# Patient Record
Sex: FEMALE | Race: BLACK OR AFRICAN AMERICAN | NOT HISPANIC OR LATINO | URBAN - METROPOLITAN AREA
[De-identification: names, ages, dates, MRNs, and addresses within clinical notes are randomized per-mention and may not be internally consistent; named-entity substitution may affect disease eponyms.]

---

## 2024-05-23 ENCOUNTER — EMERGENCY (EMERGENCY)
Facility: HOSPITAL | Age: 39
LOS: 1 days | Discharge: HOME | End: 2024-05-23
Attending: STUDENT IN AN ORGANIZED HEALTH CARE EDUCATION/TRAINING PROGRAM
Payer: COMMERCIAL

## 2024-05-23 VITALS
TEMPERATURE: 98 F | OXYGEN SATURATION: 100 % | RESPIRATION RATE: 19 BRPM | SYSTOLIC BLOOD PRESSURE: 106 MMHG | DIASTOLIC BLOOD PRESSURE: 70 MMHG | HEART RATE: 85 BPM

## 2024-05-23 VITALS
WEIGHT: 139.99 LBS | HEART RATE: 57 BPM | TEMPERATURE: 99 F | HEIGHT: 65 IN | OXYGEN SATURATION: 98 % | DIASTOLIC BLOOD PRESSURE: 73 MMHG | SYSTOLIC BLOOD PRESSURE: 126 MMHG | RESPIRATION RATE: 22 BRPM

## 2024-05-23 LAB
ALBUMIN SERPL ELPH-MCNC: 4.5 G/DL — SIGNIFICANT CHANGE UP (ref 3.3–5)
ALP SERPL-CCNC: 64 U/L — SIGNIFICANT CHANGE UP (ref 40–120)
ALT FLD-CCNC: 13 U/L — SIGNIFICANT CHANGE UP (ref 10–45)
ANION GAP SERPL CALC-SCNC: 12 MMOL/L — SIGNIFICANT CHANGE UP (ref 5–17)
APPEARANCE UR: CLEAR — SIGNIFICANT CHANGE UP
APTT BLD: 31.7 SEC — SIGNIFICANT CHANGE UP (ref 24.5–35.6)
AST SERPL-CCNC: 16 U/L — SIGNIFICANT CHANGE UP (ref 10–40)
BASOPHILS # BLD AUTO: 0.03 K/UL — SIGNIFICANT CHANGE UP (ref 0–0.2)
BASOPHILS NFR BLD AUTO: 0.3 % — SIGNIFICANT CHANGE UP (ref 0–2)
BILIRUB SERPL-MCNC: 0.3 MG/DL — SIGNIFICANT CHANGE UP (ref 0.2–1.2)
BILIRUB UR-MCNC: NEGATIVE — SIGNIFICANT CHANGE UP
BUN SERPL-MCNC: 15 MG/DL — SIGNIFICANT CHANGE UP (ref 7–23)
CALCIUM SERPL-MCNC: 8.9 MG/DL — SIGNIFICANT CHANGE UP (ref 8.4–10.5)
CHLORIDE SERPL-SCNC: 103 MMOL/L — SIGNIFICANT CHANGE UP (ref 96–108)
CO2 SERPL-SCNC: 22 MMOL/L — SIGNIFICANT CHANGE UP (ref 22–31)
COLOR SPEC: YELLOW — SIGNIFICANT CHANGE UP
CREAT SERPL-MCNC: 0.85 MG/DL — SIGNIFICANT CHANGE UP (ref 0.5–1.3)
DIFF PNL FLD: NEGATIVE — SIGNIFICANT CHANGE UP
EGFR: 90 ML/MIN/1.73M2 — SIGNIFICANT CHANGE UP
EOSINOPHIL # BLD AUTO: 0.04 K/UL — SIGNIFICANT CHANGE UP (ref 0–0.5)
EOSINOPHIL NFR BLD AUTO: 0.4 % — SIGNIFICANT CHANGE UP (ref 0–6)
GLUCOSE SERPL-MCNC: 123 MG/DL — HIGH (ref 70–99)
GLUCOSE UR QL: NEGATIVE MG/DL — SIGNIFICANT CHANGE UP
HCG SERPL-ACNC: 899.1 MIU/ML — HIGH
HCT VFR BLD CALC: 39.7 % — SIGNIFICANT CHANGE UP (ref 34.5–45)
HGB BLD-MCNC: 12.7 G/DL — SIGNIFICANT CHANGE UP (ref 11.5–15.5)
IMM GRANULOCYTES NFR BLD AUTO: 0.4 % — SIGNIFICANT CHANGE UP (ref 0–0.9)
INR BLD: 0.95 RATIO — SIGNIFICANT CHANGE UP (ref 0.85–1.18)
KETONES UR-MCNC: NEGATIVE MG/DL — SIGNIFICANT CHANGE UP
LEUKOCYTE ESTERASE UR-ACNC: NEGATIVE — SIGNIFICANT CHANGE UP
LIDOCAIN IGE QN: 27 U/L — SIGNIFICANT CHANGE UP (ref 7–60)
LYMPHOCYTES # BLD AUTO: 1.46 K/UL — SIGNIFICANT CHANGE UP (ref 1–3.3)
LYMPHOCYTES # BLD AUTO: 14.7 % — SIGNIFICANT CHANGE UP (ref 13–44)
MCHC RBC-ENTMCNC: 26.8 PG — LOW (ref 27–34)
MCHC RBC-ENTMCNC: 32 GM/DL — SIGNIFICANT CHANGE UP (ref 32–36)
MCV RBC AUTO: 83.9 FL — SIGNIFICANT CHANGE UP (ref 80–100)
MONOCYTES # BLD AUTO: 0.43 K/UL — SIGNIFICANT CHANGE UP (ref 0–0.9)
MONOCYTES NFR BLD AUTO: 4.3 % — SIGNIFICANT CHANGE UP (ref 2–14)
NEUTROPHILS # BLD AUTO: 7.9 K/UL — HIGH (ref 1.8–7.4)
NEUTROPHILS NFR BLD AUTO: 79.9 % — HIGH (ref 43–77)
NITRITE UR-MCNC: NEGATIVE — SIGNIFICANT CHANGE UP
NRBC # BLD: 0 /100 WBCS — SIGNIFICANT CHANGE UP (ref 0–0)
PH UR: 8 — SIGNIFICANT CHANGE UP (ref 5–8)
PLATELET # BLD AUTO: 229 K/UL — SIGNIFICANT CHANGE UP (ref 150–400)
POTASSIUM SERPL-MCNC: 4 MMOL/L — SIGNIFICANT CHANGE UP (ref 3.5–5.3)
POTASSIUM SERPL-SCNC: 4 MMOL/L — SIGNIFICANT CHANGE UP (ref 3.5–5.3)
PROT SERPL-MCNC: 7.2 G/DL — SIGNIFICANT CHANGE UP (ref 6–8.3)
PROT UR-MCNC: NEGATIVE MG/DL — SIGNIFICANT CHANGE UP
PROTHROM AB SERPL-ACNC: 10.5 SEC — SIGNIFICANT CHANGE UP (ref 9.5–13)
RBC # BLD: 4.73 M/UL — SIGNIFICANT CHANGE UP (ref 3.8–5.2)
RBC # FLD: 14.8 % — HIGH (ref 10.3–14.5)
SODIUM SERPL-SCNC: 137 MMOL/L — SIGNIFICANT CHANGE UP (ref 135–145)
SP GR SPEC: 1.02 — SIGNIFICANT CHANGE UP (ref 1–1.03)
UROBILINOGEN FLD QL: 0.2 MG/DL — SIGNIFICANT CHANGE UP (ref 0.2–1)
WBC # BLD: 9.9 K/UL — SIGNIFICANT CHANGE UP (ref 3.8–10.5)
WBC # FLD AUTO: 9.9 K/UL — SIGNIFICANT CHANGE UP (ref 3.8–10.5)

## 2024-05-23 PROCEDURE — 86901 BLOOD TYPING SEROLOGIC RH(D): CPT

## 2024-05-23 PROCEDURE — 76817 TRANSVAGINAL US OBSTETRIC: CPT | Mod: 26

## 2024-05-23 PROCEDURE — 80053 COMPREHEN METABOLIC PANEL: CPT

## 2024-05-23 PROCEDURE — 85730 THROMBOPLASTIN TIME PARTIAL: CPT

## 2024-05-23 PROCEDURE — 93975 VASCULAR STUDY: CPT

## 2024-05-23 PROCEDURE — 96361 HYDRATE IV INFUSION ADD-ON: CPT

## 2024-05-23 PROCEDURE — 76817 TRANSVAGINAL US OBSTETRIC: CPT

## 2024-05-23 PROCEDURE — 93975 VASCULAR STUDY: CPT | Mod: 26

## 2024-05-23 PROCEDURE — 85610 PROTHROMBIN TIME: CPT

## 2024-05-23 PROCEDURE — 99285 EMERGENCY DEPT VISIT HI MDM: CPT

## 2024-05-23 PROCEDURE — 76705 ECHO EXAM OF ABDOMEN: CPT

## 2024-05-23 PROCEDURE — 86850 RBC ANTIBODY SCREEN: CPT

## 2024-05-23 PROCEDURE — 81003 URINALYSIS AUTO W/O SCOPE: CPT

## 2024-05-23 PROCEDURE — 87086 URINE CULTURE/COLONY COUNT: CPT

## 2024-05-23 PROCEDURE — 93005 ELECTROCARDIOGRAM TRACING: CPT

## 2024-05-23 PROCEDURE — 76705 ECHO EXAM OF ABDOMEN: CPT | Mod: 26,59

## 2024-05-23 PROCEDURE — 83690 ASSAY OF LIPASE: CPT

## 2024-05-23 PROCEDURE — 96374 THER/PROPH/DIAG INJ IV PUSH: CPT

## 2024-05-23 PROCEDURE — 84702 CHORIONIC GONADOTROPIN TEST: CPT

## 2024-05-23 PROCEDURE — 86900 BLOOD TYPING SEROLOGIC ABO: CPT

## 2024-05-23 PROCEDURE — 85025 COMPLETE CBC W/AUTO DIFF WBC: CPT

## 2024-05-23 PROCEDURE — 99285 EMERGENCY DEPT VISIT HI MDM: CPT | Mod: 25

## 2024-05-23 PROCEDURE — 96375 TX/PRO/DX INJ NEW DRUG ADDON: CPT

## 2024-05-23 RX ORDER — ONDANSETRON 8 MG/1
4 TABLET, FILM COATED ORAL ONCE
Refills: 0 | Status: COMPLETED | OUTPATIENT
Start: 2024-05-23 | End: 2024-05-23

## 2024-05-23 RX ORDER — ACETAMINOPHEN 500 MG
1000 TABLET ORAL ONCE
Refills: 0 | Status: COMPLETED | OUTPATIENT
Start: 2024-05-23 | End: 2024-05-23

## 2024-05-23 RX ORDER — SODIUM CHLORIDE 9 MG/ML
1000 INJECTION, SOLUTION INTRAVENOUS ONCE
Refills: 0 | Status: COMPLETED | OUTPATIENT
Start: 2024-05-23 | End: 2024-05-23

## 2024-05-23 RX ORDER — KETOROLAC TROMETHAMINE 30 MG/ML
15 SYRINGE (ML) INJECTION ONCE
Refills: 0 | Status: DISCONTINUED | OUTPATIENT
Start: 2024-05-23 | End: 2024-05-23

## 2024-05-23 RX ADMIN — ONDANSETRON 4 MILLIGRAM(S): 8 TABLET, FILM COATED ORAL at 06:39

## 2024-05-23 RX ADMIN — SODIUM CHLORIDE 1000 MILLILITER(S): 9 INJECTION, SOLUTION INTRAVENOUS at 06:38

## 2024-05-23 RX ADMIN — Medication 1000 MILLIGRAM(S): at 12:16

## 2024-05-23 RX ADMIN — Medication 15 MILLIGRAM(S): at 06:39

## 2024-05-23 RX ADMIN — Medication 1000 MILLIGRAM(S): at 13:00

## 2024-05-23 RX ADMIN — SODIUM CHLORIDE 1000 MILLILITER(S): 9 INJECTION, SOLUTION INTRAVENOUS at 07:45

## 2024-05-23 RX ADMIN — Medication 15 MILLIGRAM(S): at 07:25

## 2024-05-23 RX ADMIN — Medication 400 MILLIGRAM(S): at 12:01

## 2024-05-23 NOTE — ED PROVIDER NOTE - PATIENT PORTAL LINK FT
You can access the FollowMyHealth Patient Portal offered by [facility] by registering at the following website: [email or web address]. By joining [employer]’s FollowMyHealth portal, you will also be able to view your health information using other applications (apps) compatible with our system.

## 2024-05-23 NOTE — ED PROVIDER NOTE - CCCP TRG CHIEF CMPLNT
0045: TRANSFER - IN REPORT:    Verbal report received from Luann Mclain RN(name) on Alyse Pavon  being received from ED(unit) for routine progression of care      Report consisted of patients Situation, Background, Assessment and   Recommendations(SBAR). Information from the following report(s) SBAR, Kardex, ED Summary, Intake/Output, MAR, Recent Results, Med Rec Status and Cardiac Rhythm NSR/Sinus Tach was reviewed with the receiving nurse. Opportunity for questions and clarification was provided. Assessment completed upon patients arrival to unit and care assumed. [id number]: The documentation for this period is being entered following the guidelines as defined in the [facility] downtime policy by Sai Robbins. 0700: End of Shift Note    Bedside shift change report given to Beth Turner RN (oncoming nurse) by Sai Robbins (offgoing nurse). Report included the following information SBAR, Kardex, Intake/Output, MAR, Recent Results, Med Rec Status and Cardiac Rhythm NSR    Shift worked:  7p-7a     Shift summary and any significant changes:     Patient arrived to unit. No significant changes.      Concerns for physician to address:  None     Zone phone for oncoming shift:   xxxx       Activity:     Number times ambulated in hallways past shift: 0  Number of times OOB to chair past shift: 0    Cardiac:   Cardiac Monitoring: Yes      Cardiac Rhythm: Sinus Tachy    Access:   Current line(s): PIV     Genitourinary:   Urinary status: voiding, incontinent and external catheter    Respiratory:   O2 Device: None (Room air)  Chronic home O2 use?: NO  Incentive spirometer at bedside: NO     GI:     Current diet:  DIET DIABETIC CONSISTENT CARB Regular  Passing flatus: YES  Tolerating current diet: YES       Pain Management:   Patient states pain is manageable on current regimen: YES    Skin:     Interventions: increase time out of bed and internal/external urinary devices    Patient Safety:  Fall Score:    Interventions: bed/chair alarm, gripper socks and pt to call before getting OOB       Length of Stay:  Expected LOS: - - -  Actual LOS: [address] nausea and vomiting/abdominal pain

## 2024-05-23 NOTE — ED PROVIDER NOTE - CLINICAL SUMMARY MEDICAL DECISION MAKING FREE TEXT BOX
38 year old female with no surgical or medical hx comes into the ED for sudden onset right sided abd pain that woke her up from sleep. She has had associated nausea and some vomiting. Ddx includes but not limited to Acute choley, appy, renal stone, UTI/Pyelo. Torsion unlikely given location of pain. Will order CBC, CMP, lipse, UA, RUQ US and will concider CT if US is negative.

## 2024-05-23 NOTE — ED PROVIDER NOTE - OBJECTIVE STATEMENT
38 year old female with no surgical or medical hx comes into the ED for sudden onset right sided abd pain that woke her up from sleep. She has had associated nausea and some vomiting. She has never had this kind of pain before. She denies any change in BM, urinary symptoms, vaginal discharge.

## 2024-05-23 NOTE — ED ADULT NURSE REASSESSMENT NOTE - NS ED NURSE REASSESS COMMENT FT1
0725 Pt in ER red/dora rm 38. IV fluid bolus in progress. IV site intact without sx of infilt RACF. States abd pain 1/10 almost resolved RUQ/RLQ. Awaiting urine sample and ultrasound

## 2024-05-23 NOTE — ED PROVIDER NOTE - CARE PLAN
1 Principal Discharge DX:	Abdominal pain   Principal Discharge DX:	Pregnancy of unknown anatomic location  Secondary Diagnosis:	Abdominal pain

## 2024-05-23 NOTE — ED PROVIDER NOTE - NSPTACCESSSVCSAPPTDETAILS_ED_ALL_ED_FT
Please help make appointment in 2 days for repeat bhcg and ultrasound for rule out ectopic pregnancy

## 2024-05-23 NOTE — ED PROVIDER NOTE - PHYSICAL EXAMINATION
GENERAL: non-toxic appearing  HEAD: normocephalic, atraumatic  HEENT: EOMI, normal conjunctiva, oral mucosa moist, neck supple  CARDIAC: regular rate and rhythm  PULM: normal work of breathing, speaking in full sentences  GI: abdomen nondistended, soft, + RUQ tenderness, no guarding or rebound tenderness  : No CVA tenderness, no suprapubic tenderness  NEURO: alert and oriented x 3, normal speech, no focal motor or sensory deficits, gait normal, no gross neurologic deficit  MSK: No visible deformities, no peripheral edema,   SKIN: No visible rashes, dry, well-perfused  PSYCH: appropriate mood and affect

## 2024-05-23 NOTE — ED PROVIDER NOTE - PROGRESS NOTE DETAILS
Parish, PGY3 - Received signout on patient.  38-year-old woman with no PMH, no surgical history, presenting due to sudden onset right-sided pain associated with nausea and vomiting.  hCG at this time is positive, patient just came back from her right upper quadrant ultrasound which does not appear to show acute cholecystitis will wait on final radiology read.  However on reassessment pain appears to be more right periumbilical, questionable flank although no CVA tenderness.  Due to positive hCG will get a transvaginal ultrasound for further assessment of ovaries and rule out ectopic.  Patient at this time declining additional pain medication Parish, PGY3 - Transvaginal ultrasound is concerning for pregnancy of unknown location.  No abdominal cramping, no vaginal bleeding or discharge.  Patient will follow-up with her own OB/GYN in 2 days for repeat beta hCG and ultrasound, however was advised to return to the emergency room if this was not possible. Patient stable for discharge. Understands the Emergency Room work-up and discharge precautions. Will follow-up with obgyn. passed po trial.

## 2024-05-23 NOTE — ED PROVIDER NOTE - ATTENDING CONTRIBUTION TO CARE
Attending GAGE Mathew performed a history and physical exam of the patient and discussed their management with the resident. I reviewed the resident's note and agree with the documented findings and plan of care, except as noted. My medical decision making and observations are as follows:    38 F with no PMH presenting for evaluation of acute right-sided abdominal pain that woke her from sleep, associated with nausea and NBNB emesis.  No fever, chest pain, shortness of breath, cough, diarrhea, dysuria or hematuria, vaginal bleeding or discharge.  On exam, patient no acute distress, normal work of breathing, speaking full sentences.  Heart lungs clear to auscultation, abdomen soft with right upper quadrant tenderness, no Martin sign.  No CVA tenderness, no pedal edema.    MDM–vital signs stable, symptoms concerning for possible acute cholecystitis versus gastritis versus pancreatitis, will evaluate with labs and ultrasound.  Meds and fluids for symptomatic treatment.    On my independent interpretation, EKG shows NSR rate 93, normal axis, normal intervals, no acute ischemia.  No prior for comparison.

## 2024-05-23 NOTE — ED ADULT NURSE NOTE - BREATHING
PRINCIPAL PROCEDURE  Procedure: Right total knee replacement  Findings and Treatment:        spontaneous/unlabored

## 2024-05-23 NOTE — ED ADULT NURSE NOTE - OBJECTIVE STATEMENT
37 y/o female presents to the ED endorsing RLQ radiating to the RUQ starting at 1 am on 5/23/24. A/Ox4. Ambulatory without assistive devices at baseline. PMH: Denies. PSH: Denies. Patient endorses nausea with bilious vomit. Patient denies fever, chills, chest pain, dyspnea, palpitations and numbness/tingling. Upon assessment, abdomen is soft, tender to the RLQ and non-distended. Safety and comfort provided.

## 2024-05-23 NOTE — ED PROVIDER NOTE - NSFOLLOWUPINSTRUCTIONS_ED_ALL_ED_FT
You were seen in the Emergency Room today because of abdominal pain. You have a pregnancy of unknown location. A copy of your results is included in your discharge paperwork.     You can take Tylenol as directed for pain.     We also recommend you following up with your ObGyn in 2 days for further management and workup. You will need a repeat b-hcg (pregnancy hormone) and ultrasound.     DISCHARGE INSTRUCTIONS:  Please return to the Emergency Room if:   •You have chest pain or trouble breathing.  •You have sharp pain in your lower abdomen that is severe and starts suddenly.  •You have increasing abdominal or pelvic pain or heavy vaginal bleeding.  •Any new or concerning symptoms.

## 2024-05-24 ENCOUNTER — EMERGENCY (EMERGENCY)
Facility: HOSPITAL | Age: 39
LOS: 1 days | Discharge: HOME | End: 2024-05-24
Attending: EMERGENCY MEDICINE
Payer: COMMERCIAL

## 2024-05-24 VITALS
HEIGHT: 65 IN | SYSTOLIC BLOOD PRESSURE: 122 MMHG | RESPIRATION RATE: 18 BRPM | HEART RATE: 88 BPM | TEMPERATURE: 98 F | OXYGEN SATURATION: 100 % | WEIGHT: 139.99 LBS | DIASTOLIC BLOOD PRESSURE: 62 MMHG

## 2024-05-24 LAB
ALBUMIN SERPL ELPH-MCNC: 4 G/DL — SIGNIFICANT CHANGE UP (ref 3.3–5)
ALP SERPL-CCNC: 59 U/L — SIGNIFICANT CHANGE UP (ref 40–120)
ALT FLD-CCNC: 10 U/L — SIGNIFICANT CHANGE UP (ref 10–45)
ANION GAP SERPL CALC-SCNC: 12 MMOL/L — SIGNIFICANT CHANGE UP (ref 5–17)
APTT BLD: 29.1 SEC — SIGNIFICANT CHANGE UP (ref 24.5–35.6)
AST SERPL-CCNC: 16 U/L — SIGNIFICANT CHANGE UP (ref 10–40)
BASE EXCESS BLDV CALC-SCNC: -3.4 MMOL/L — LOW (ref -2–3)
BASOPHILS # BLD AUTO: 0.02 K/UL — SIGNIFICANT CHANGE UP (ref 0–0.2)
BASOPHILS NFR BLD AUTO: 0.2 % — SIGNIFICANT CHANGE UP (ref 0–2)
BILIRUB SERPL-MCNC: 0.4 MG/DL — SIGNIFICANT CHANGE UP (ref 0.2–1.2)
BLD GP AB SCN SERPL QL: NEGATIVE — SIGNIFICANT CHANGE UP
BUN SERPL-MCNC: 10 MG/DL — SIGNIFICANT CHANGE UP (ref 7–23)
CA-I SERPL-SCNC: 1.19 MMOL/L — SIGNIFICANT CHANGE UP (ref 1.15–1.33)
CALCIUM SERPL-MCNC: 8.9 MG/DL — SIGNIFICANT CHANGE UP (ref 8.4–10.5)
CHLORIDE BLDV-SCNC: 105 MMOL/L — SIGNIFICANT CHANGE UP (ref 96–108)
CHLORIDE SERPL-SCNC: 105 MMOL/L — SIGNIFICANT CHANGE UP (ref 96–108)
CO2 BLDV-SCNC: 24 MMOL/L — SIGNIFICANT CHANGE UP (ref 22–26)
CO2 SERPL-SCNC: 21 MMOL/L — LOW (ref 22–31)
CREAT SERPL-MCNC: 1 MG/DL — SIGNIFICANT CHANGE UP (ref 0.5–1.3)
CULTURE RESULTS: SIGNIFICANT CHANGE UP
EGFR: 74 ML/MIN/1.73M2 — SIGNIFICANT CHANGE UP
EOSINOPHIL # BLD AUTO: 0.03 K/UL — SIGNIFICANT CHANGE UP (ref 0–0.5)
EOSINOPHIL NFR BLD AUTO: 0.3 % — SIGNIFICANT CHANGE UP (ref 0–6)
GAS PNL BLDV: 135 MMOL/L — LOW (ref 136–145)
GAS PNL BLDV: SIGNIFICANT CHANGE UP
GLUCOSE BLDV-MCNC: 110 MG/DL — HIGH (ref 70–99)
GLUCOSE SERPL-MCNC: 115 MG/DL — HIGH (ref 70–99)
HCG SERPL-ACNC: 1690 MIU/ML — HIGH
HCO3 BLDV-SCNC: 23 MMOL/L — SIGNIFICANT CHANGE UP (ref 22–29)
HCT VFR BLD CALC: 35.4 % — SIGNIFICANT CHANGE UP (ref 34.5–45)
HCT VFR BLDA CALC: 36 % — SIGNIFICANT CHANGE UP (ref 34.5–46.5)
HGB BLD CALC-MCNC: 12 G/DL — SIGNIFICANT CHANGE UP (ref 11.7–16.1)
HGB BLD-MCNC: 11.5 G/DL — SIGNIFICANT CHANGE UP (ref 11.5–15.5)
IMM GRANULOCYTES NFR BLD AUTO: 0.4 % — SIGNIFICANT CHANGE UP (ref 0–0.9)
INR BLD: 1.1 RATIO — SIGNIFICANT CHANGE UP (ref 0.85–1.18)
LACTATE BLDV-MCNC: 1.4 MMOL/L — SIGNIFICANT CHANGE UP (ref 0.5–2)
LYMPHOCYTES # BLD AUTO: 1.15 K/UL — SIGNIFICANT CHANGE UP (ref 1–3.3)
LYMPHOCYTES # BLD AUTO: 12 % — LOW (ref 13–44)
MCHC RBC-ENTMCNC: 26.7 PG — LOW (ref 27–34)
MCHC RBC-ENTMCNC: 32.5 GM/DL — SIGNIFICANT CHANGE UP (ref 32–36)
MCV RBC AUTO: 82.3 FL — SIGNIFICANT CHANGE UP (ref 80–100)
MONOCYTES # BLD AUTO: 0.62 K/UL — SIGNIFICANT CHANGE UP (ref 0–0.9)
MONOCYTES NFR BLD AUTO: 6.5 % — SIGNIFICANT CHANGE UP (ref 2–14)
NEUTROPHILS # BLD AUTO: 7.74 K/UL — HIGH (ref 1.8–7.4)
NEUTROPHILS NFR BLD AUTO: 80.6 % — HIGH (ref 43–77)
NRBC # BLD: 0 /100 WBCS — SIGNIFICANT CHANGE UP (ref 0–0)
PCO2 BLDV: 44 MMHG — HIGH (ref 39–42)
PH BLDV: 7.32 — SIGNIFICANT CHANGE UP (ref 7.32–7.43)
PLATELET # BLD AUTO: 207 K/UL — SIGNIFICANT CHANGE UP (ref 150–400)
PO2 BLDV: 28 MMHG — SIGNIFICANT CHANGE UP (ref 25–45)
POTASSIUM BLDV-SCNC: 3.8 MMOL/L — SIGNIFICANT CHANGE UP (ref 3.5–5.1)
POTASSIUM SERPL-MCNC: 3.8 MMOL/L — SIGNIFICANT CHANGE UP (ref 3.5–5.3)
POTASSIUM SERPL-SCNC: 3.8 MMOL/L — SIGNIFICANT CHANGE UP (ref 3.5–5.3)
PROT SERPL-MCNC: 7.1 G/DL — SIGNIFICANT CHANGE UP (ref 6–8.3)
PROTHROM AB SERPL-ACNC: 11.5 SEC — SIGNIFICANT CHANGE UP (ref 9.5–13)
RBC # BLD: 4.3 M/UL — SIGNIFICANT CHANGE UP (ref 3.8–5.2)
RBC # FLD: 14.6 % — HIGH (ref 10.3–14.5)
RH IG SCN BLD-IMP: POSITIVE — SIGNIFICANT CHANGE UP
SAO2 % BLDV: 42.5 % — LOW (ref 67–88)
SODIUM SERPL-SCNC: 138 MMOL/L — SIGNIFICANT CHANGE UP (ref 135–145)
SPECIMEN SOURCE: SIGNIFICANT CHANGE UP
WBC # BLD: 9.6 K/UL — SIGNIFICANT CHANGE UP (ref 3.8–10.5)
WBC # FLD AUTO: 9.6 K/UL — SIGNIFICANT CHANGE UP (ref 3.8–10.5)

## 2024-05-24 PROCEDURE — 93975 VASCULAR STUDY: CPT | Mod: 26

## 2024-05-24 PROCEDURE — 76801 OB US < 14 WKS SINGLE FETUS: CPT | Mod: 26,59

## 2024-05-24 PROCEDURE — 76817 TRANSVAGINAL US OBSTETRIC: CPT | Mod: 26

## 2024-05-24 PROCEDURE — 99283 EMERGENCY DEPT VISIT LOW MDM: CPT

## 2024-05-24 PROCEDURE — 99285 EMERGENCY DEPT VISIT HI MDM: CPT

## 2024-05-24 RX ORDER — ACETAMINOPHEN 500 MG
1000 TABLET ORAL ONCE
Refills: 0 | Status: COMPLETED | OUTPATIENT
Start: 2024-05-24 | End: 2024-05-24

## 2024-05-24 RX ORDER — FENTANYL CITRATE 50 UG/ML
25 INJECTION INTRAVENOUS ONCE
Refills: 0 | Status: DISCONTINUED | OUTPATIENT
Start: 2024-05-24 | End: 2024-05-24

## 2024-05-24 RX ORDER — ONDANSETRON 8 MG/1
4 TABLET, FILM COATED ORAL ONCE
Refills: 0 | Status: COMPLETED | OUTPATIENT
Start: 2024-05-24 | End: 2024-05-24

## 2024-05-24 RX ORDER — SODIUM CHLORIDE 9 MG/ML
1000 INJECTION, SOLUTION INTRAVENOUS ONCE
Refills: 0 | Status: COMPLETED | OUTPATIENT
Start: 2024-05-24 | End: 2024-05-24

## 2024-05-24 RX ORDER — ACETAMINOPHEN 500 MG
1000 TABLET ORAL ONCE
Refills: 0 | Status: DISCONTINUED | OUTPATIENT
Start: 2024-05-24 | End: 2024-05-24

## 2024-05-24 RX ORDER — FENTANYL CITRATE 50 UG/ML
50 INJECTION INTRAVENOUS ONCE
Refills: 0 | Status: DISCONTINUED | OUTPATIENT
Start: 2024-05-24 | End: 2024-05-24

## 2024-05-24 RX ADMIN — Medication 400 MILLIGRAM(S): at 23:28

## 2024-05-24 RX ADMIN — ONDANSETRON 4 MILLIGRAM(S): 8 TABLET, FILM COATED ORAL at 21:46

## 2024-05-24 RX ADMIN — FENTANYL CITRATE 50 MICROGRAM(S): 50 INJECTION INTRAVENOUS at 22:10

## 2024-05-24 RX ADMIN — FENTANYL CITRATE 25 MICROGRAM(S): 50 INJECTION INTRAVENOUS at 21:42

## 2024-05-24 RX ADMIN — FENTANYL CITRATE 25 MICROGRAM(S): 50 INJECTION INTRAVENOUS at 23:24

## 2024-05-24 RX ADMIN — SODIUM CHLORIDE 1000 MILLILITER(S): 9 INJECTION, SOLUTION INTRAVENOUS at 21:52

## 2024-05-24 NOTE — ED PROVIDER NOTE - OBJECTIVE STATEMENT
Patient is a 38-year-old female with no seen past medical history presents emergency room for complaining of right-sided abdominal pain.  Patient states that she was here recently for a hCG and transvaginal ultrasound to rule out ectopic.  Patient states that she was supposed to follow-up with her OB/GYN tomorrow however she has been having significant abdominal pain and she decided to come get evaluated.  Patient denies any fevers, chills, vaginal bleeding, vaginal discharge.  Patient states the abdominal pain is radiating to her back and acutely started.

## 2024-05-24 NOTE — ED PROVIDER NOTE - CLINICAL SUMMARY MEDICAL DECISION MAKING FREE TEXT BOX
Patient presents emergency department with significant abdominal pain. Patient is hemodynamically stable afebrile on presentation.  Patient physical exam limited due to pain.  Urgent transvaginal ultrasound ordered and ultrasound tech called to expedite scan.  OB/GYN has been called and will come see the patient.  Will obtain lab work and imaging to evaluate for acute pathologies.  Pending labs and imaging for further disposition.

## 2024-05-24 NOTE — ED PROVIDER NOTE - PROGRESS NOTE DETAILS
Quorishy- Patient in severe pain on exam  U/S shows possible trace free fluid on right side- stat TVUS ordered and tech will come down.   OBGTN has been consulted. Qujung- spoke with Radiology- MRI tech to be called in and urgent MRI to be obtained. Attending MD Dimas.  Pt signed out to me in stable condition pending reassess, dispo, 39 yo fem with desired preg of unknown location now s/p visit 48 hrs with no clarity, worsening abd'l pain, sono with unknown fluid, now s/p MRI with trace free fluid. Yesenia Garland M.D. (Resident Physician): Pt feeling slightly better. Will dc with return to ED or Ob tomorrow for beta Hcg and US. Attending MD Dimas.  Pt reassessed and endorsing sxs improvement with ofirmev.  She has strong concern re: return of pain.  Prolonged discussion re: results of blood/urine/sono/HCG level and MRI.  Counseled re: fibroids/pain control, free pelvic fluid as well as interpretation of HCG level:radiographic findings.  Pt verbalizes understanding of need for repeat beta HCG and understanding of ectopic pregnancy potential with need for confirmation of IUP for safety.  Return to ED for new/worsening sxs including severe or worsened pain, change in nature of pain or development of new sxs not currently experienced.  Rx for antiemetic, f/u with OB. Yesenia Garland M.D. (Resident Physician): Pt started having pain again and was unable to stand up. Will put pt in CDU, Ob/Gyn aware.

## 2024-05-24 NOTE — ED PROVIDER NOTE - NSFOLLOWUPINSTRUCTIONS_ED_ALL_ED_FT
You have been evaluated in the Emergency Department today for abdominal pain in the setting of pregnancy. Your evaluation did not show evidence of medical conditions requiring emergent intervention at this time. Your results are attached.     For pain, you can take TYLENOL/ACETAMINOPHEN up to 4,000mg a day for your symptoms in four divided doses. You can also use mile heat packs/take a warm bath. Please stay well hydrated.    Return to the ER tomorrow Sunday 5/26 to have a repeat beta Hcg drawn and another ultrasound.     Please see the attached information for scheduling an appointment with a Ob/Gyn.    Return to the Emergency Department if you experience worsening or uncontrolled pain, vaginal bleeding, fevers 100.4°F or greater, recurrent vomiting, or any other concerning symptoms.    Thank you for choosing us for your care. You have been evaluated in the Emergency Department today for abdominal pain in the setting of pregnancy. Your evaluation did not show evidence of medical conditions requiring emergent intervention at this time. Your results are attached.     For pain, you can take TYLENOL/ACETAMINOPHEN up to 4,000mg a day for your symptoms in four divided doses. You can also use mile heat packs/take a warm bath. Please stay well hydrated. Please  and take the Zofran for nausea as prescribed.     Return to the ER tomorrow Sunday 5/26 to have a repeat beta Hcg drawn and another ultrasound.     Please see the attached information for scheduling an appointment with a Ob/Gyn.    Return to the Emergency Department if you experience worsening or uncontrolled pain, vaginal bleeding, fevers 100.4°F or greater, recurrent vomiting, or any other concerning symptoms.    Thank you for choosing us for your care.

## 2024-05-24 NOTE — ED ADULT TRIAGE NOTE - CHIEF COMPLAINT QUOTE
37 yo 1-2 weeks pregnant seen yesterday for r/o ectopic back today for increased pain. c/o RUQ/RLQ and chest pain.

## 2024-05-24 NOTE — ED PROVIDER NOTE - PHYSICAL EXAMINATION
Physical Exam:  Gen: significant pain / distress   Head: NCAT  HEENT: EOMI, PEERLA, normal conjunctiva, tongue midline, oral mucosa moist  Lung: CTAB, no respiratory distress, no wheezes/rhonchi/rales B/L, speaking in full sentences  CV: RRR,   Abd: significant RLQ abd tenderness   MSK: no visible deformities, ROM normal in UE/L  Neuro: No focal sensory or motor deficits  Skin: Warm, well perfused, no rash, no leg swelling

## 2024-05-24 NOTE — ED ADULT NURSE NOTE - OBJECTIVE STATEMENT
39 yo female no PMH, A&ox3, presents to ED c/o RUQ/RLQ pain.  PT reports was seen in ED last night, had imaging and HCG, unknown pregnancy location, d/c and was to follow up w/ OB. TO day an hour prior to ED arrival, started having severe onset of pain, n/v.  Breathing even and unlabored, abdomen soft tender to lower abdominal quadrants, no pedal edema. Pt denies chest pain, palpitations, shortness of breath, headache, visual disturbances, numbness/tingling, fever, chills, diaphoresis, diarrhea, or urinary symptoms.

## 2024-05-24 NOTE — ED PROVIDER NOTE - PATIENT PORTAL LINK FT
You can access the FollowMyHealth Patient Portal offered by [facility] by registering at the following website: [email or web address]. By joining [employer]’s FollowMyHealth portal, you will also be able to view your health information using other applications (apps) compatible with our system.

## 2024-05-24 NOTE — ED PROVIDER NOTE - PROVIDER TOKENS
PROVIDER:[TOKEN:[[id number]],FOLLOWUP:[Urgent]],PROVIDER:[TOKEN:[[id number]],FOLLOWUP:[Urgent]]

## 2024-05-24 NOTE — ED PROVIDER NOTE - ATTENDING CONTRIBUTION TO CARE
38-year-old female no significant past medical history presenting to the emergency room with right sided lower abdominal pain as well as right upper quadrant pain.  Patient was seen here yesterday with right-sided abdominal pain found to be pregnant with a beta-hCG of approximately 900 and a transvaginal ultrasound showing pregnancy of unknown location.  Patient was discharged and instructed to follow-up with OB in 48 hours for repeat labs and imaging.  She is returning today due to acute onset of right sided lower abdominal pain as well as right upper quadrant pain.  No central chest pain shortness of breath.  No syncope.  Positive constipation.  No urinary symptoms.  No vaginal bleeding.  Hemodynamically stable not tachycardic or hypotensive.  Physical exam uncomfortable female dry mucous membranes normal heart and lung exam abdomen softly distended with palpable uterus (history of fibroids) and diffuse lower abdominal tenderness to palpation.  Extremities warm and well-perfused cap refill less than 2 seconds.  No focal neurological deficit.  Concern for ruptured ectopic pregnancy.  Bedside POCUS with questionable free fluid in the right upper quadrant.  OB/GYN was consulted they state they will see the patient after labs and imaging have resulted.  Ultrasound was called to perform a bedside transvaginal.  Will send off preop labs provide pain control monitor closely dispo pending results of labs imaging and reassessment.

## 2024-05-24 NOTE — ED PROVIDER NOTE - CARE PROVIDER_API CALL
Belkys Rodriguez  Obstetrics and Gynecology  [address], [address]  [place] [address]  Phone: [phone]  Fax: [phone]  Follow Up Time: Urgent    Argenis Anderson  Obstetrics and Gynecology  [address], [address]  [place] [address]  Phone: [phone]  Fax: [phone]  Follow Up Time: Urgent

## 2024-05-25 VITALS
TEMPERATURE: 98 F | RESPIRATION RATE: 18 BRPM | SYSTOLIC BLOOD PRESSURE: 107 MMHG | HEART RATE: 87 BPM | OXYGEN SATURATION: 100 % | DIASTOLIC BLOOD PRESSURE: 56 MMHG

## 2024-05-25 PROCEDURE — 96375 TX/PRO/DX INJ NEW DRUG ADDON: CPT

## 2024-05-25 PROCEDURE — 86901 BLOOD TYPING SEROLOGIC RH(D): CPT

## 2024-05-25 PROCEDURE — 99284 EMERGENCY DEPT VISIT MOD MDM: CPT | Mod: GC

## 2024-05-25 PROCEDURE — 82435 ASSAY OF BLOOD CHLORIDE: CPT

## 2024-05-25 PROCEDURE — 99223 1ST HOSP IP/OBS HIGH 75: CPT

## 2024-05-25 PROCEDURE — 72195 MRI PELVIS W/O DYE: CPT | Mod: 26,MC

## 2024-05-25 PROCEDURE — 93005 ELECTROCARDIOGRAM TRACING: CPT

## 2024-05-25 PROCEDURE — 82947 ASSAY GLUCOSE BLOOD QUANT: CPT

## 2024-05-25 PROCEDURE — 74181 MRI ABDOMEN W/O CONTRAST: CPT | Mod: MC

## 2024-05-25 PROCEDURE — 76817 TRANSVAGINAL US OBSTETRIC: CPT

## 2024-05-25 PROCEDURE — 85018 HEMOGLOBIN: CPT

## 2024-05-25 PROCEDURE — 82803 BLOOD GASES ANY COMBINATION: CPT

## 2024-05-25 PROCEDURE — 85730 THROMBOPLASTIN TIME PARTIAL: CPT

## 2024-05-25 PROCEDURE — 84132 ASSAY OF SERUM POTASSIUM: CPT

## 2024-05-25 PROCEDURE — 99285 EMERGENCY DEPT VISIT HI MDM: CPT | Mod: 25

## 2024-05-25 PROCEDURE — 84702 CHORIONIC GONADOTROPIN TEST: CPT

## 2024-05-25 PROCEDURE — 96374 THER/PROPH/DIAG INJ IV PUSH: CPT

## 2024-05-25 PROCEDURE — 86900 BLOOD TYPING SEROLOGIC ABO: CPT

## 2024-05-25 PROCEDURE — 85014 HEMATOCRIT: CPT

## 2024-05-25 PROCEDURE — 76801 OB US < 14 WKS SINGLE FETUS: CPT

## 2024-05-25 PROCEDURE — 83605 ASSAY OF LACTIC ACID: CPT

## 2024-05-25 PROCEDURE — 82330 ASSAY OF CALCIUM: CPT

## 2024-05-25 PROCEDURE — 72195 MRI PELVIS W/O DYE: CPT | Mod: MC

## 2024-05-25 PROCEDURE — 80053 COMPREHEN METABOLIC PANEL: CPT

## 2024-05-25 PROCEDURE — 85025 COMPLETE CBC W/AUTO DIFF WBC: CPT

## 2024-05-25 PROCEDURE — G0378: CPT

## 2024-05-25 PROCEDURE — 74181 MRI ABDOMEN W/O CONTRAST: CPT | Mod: 26,MC

## 2024-05-25 PROCEDURE — 86850 RBC ANTIBODY SCREEN: CPT

## 2024-05-25 PROCEDURE — 85610 PROTHROMBIN TIME: CPT

## 2024-05-25 PROCEDURE — 36415 COLL VENOUS BLD VENIPUNCTURE: CPT

## 2024-05-25 PROCEDURE — 96376 TX/PRO/DX INJ SAME DRUG ADON: CPT

## 2024-05-25 PROCEDURE — 93975 VASCULAR STUDY: CPT

## 2024-05-25 PROCEDURE — 84295 ASSAY OF SERUM SODIUM: CPT

## 2024-05-25 RX ORDER — ACETAMINOPHEN 500 MG
1000 TABLET ORAL ONCE
Refills: 0 | Status: COMPLETED | OUTPATIENT
Start: 2024-05-25 | End: 2024-05-25

## 2024-05-25 RX ORDER — SODIUM CHLORIDE 9 MG/ML
1000 INJECTION, SOLUTION INTRAVENOUS
Refills: 0 | Status: DISCONTINUED | OUTPATIENT
Start: 2024-05-25 | End: 2024-05-28

## 2024-05-25 RX ORDER — ONDANSETRON 8 MG/1
1 TABLET, FILM COATED ORAL
Qty: 1 | Refills: 0
Start: 2024-05-25 | End: 2024-05-27

## 2024-05-25 RX ORDER — INDOMETHACIN 50 MG
50 CAPSULE ORAL ONCE
Refills: 0 | Status: COMPLETED | OUTPATIENT
Start: 2024-05-25 | End: 2024-05-25

## 2024-05-25 RX ORDER — FENTANYL CITRATE 50 UG/ML
25 INJECTION INTRAVENOUS ONCE
Refills: 0 | Status: DISCONTINUED | OUTPATIENT
Start: 2024-05-25 | End: 2024-05-25

## 2024-05-25 RX ORDER — ONDANSETRON 8 MG/1
4 TABLET, FILM COATED ORAL EVERY 6 HOURS
Refills: 0 | Status: DISCONTINUED | OUTPATIENT
Start: 2024-05-25 | End: 2024-05-28

## 2024-05-25 RX ADMIN — Medication 400 MILLIGRAM(S): at 08:21

## 2024-05-25 RX ADMIN — FENTANYL CITRATE 25 MICROGRAM(S): 50 INJECTION INTRAVENOUS at 12:28

## 2024-05-25 RX ADMIN — Medication 50 MILLIGRAM(S): at 13:35

## 2024-05-25 RX ADMIN — SODIUM CHLORIDE 125 MILLILITER(S): 9 INJECTION, SOLUTION INTRAVENOUS at 13:14

## 2024-05-25 RX ADMIN — SODIUM CHLORIDE 125 MILLILITER(S): 9 INJECTION, SOLUTION INTRAVENOUS at 12:32

## 2024-05-25 NOTE — ED CDU PROVIDER DISPOSITION NOTE - NSFOLLOWUPCLINICS_GEN_ALL_ED_FT
[facility]  OB-GYN  [address] [place] [address]  Phone: [phone]  Fax:

## 2024-05-25 NOTE — ED CDU PROVIDER DISPOSITION NOTE - PATIENT PORTAL LINK FT
You can access the FollowMyHealth Patient Portal offered by [facility] by registering at the following website: [email or web address]. By joining [facility]’s FollowMyHealth portal, you will also be able to view your health information using other applications (apps) compatible with our system.

## 2024-05-25 NOTE — ED CDU PROVIDER DISPOSITION NOTE - NS ED ATTENDING STATEMENT MOD
This was a shared visit with the NATY. I reviewed and verified the documentation. There are no Wet Read(s) to document.

## 2024-05-25 NOTE — ED CDU PROVIDER INITIAL DAY NOTE - DETAILS
37 y/o female  at 4w3d by LMP  presenting for the second time to the ED for 2 day history of severe right sided abdominal pain  *PAIN CONTROL ABDOMINAL PAIN  -IVF  -PAIN CONTROL  -appreciate OB/GYN recs  -discussed with Dr Dimas

## 2024-05-25 NOTE — CONSULT NOTE ADULT - ATTENDING COMMENTS
OB attg note  37yo P0 at 4+3 here with abdominal pain, constipation, n/v, abd distension and no flatus for 3-4 days. GYN consulted for r/o ectopic pregnancy. HCG rising appropriately, currently with PUL given early GA. REcommend further workup per ED for abdominal pain.    Angel Luis BROWN

## 2024-05-25 NOTE — CONSULT NOTE ADULT - SUBJECTIVE AND OBJECTIVE BOX
**Incomplete note, recs are final at bottom**    AARON FIELD  38y  Female [id number]    HPI:        Name of GYN Physician:   OBHx:    GynHx: Denies fibroids, cysts, endometriosis, STI's, Abnormal pap smears   PMH:  PSH:  Meds:  Allx:  Social History:  Denies smoking use, drug use, alcohol use.   +occasional social alcohol use    Vital Signs Last 24 Hrs  T(C): 36.5 (24 May 2024 21:10), Max: 36.5 (24 May 2024 21:10)  T(F): 97.7 (24 May 2024 21:10), Max: 97.7 (24 May 2024 21:10)  HR: 67 (24 May 2024 23:50) (66 - 88)  BP: 112/81 (24 May 2024 23:50) (109/74 - 123/79)  BP(mean): --  RR: 18 (24 May 2024 23:50) (18 - 18)  SpO2: 100% (24 May 2024 23:50) (100% - 100%)    Parameters below as of 24 May 2024 23:50  Patient On (Oxygen Delivery Method): room air        Physical Exam:   General: sitting comfortably in bed, NAD   HEENT: neck supple, full ROM  CV: extremities well perfused  Lungs: normal respiratory effort on room air  Back: No CVA tenderness  Abd: Soft, non-tender, non-distended  :  No bleeding on pad.  External labia wnl. Bimanual exam with no cervical motion tenderness, uterus wnl, adnexa non palpable b/l.  Cervix closed vs. Cervix dilated  Speculum Exam: No active bleeding from os.  Ext: non-tender b/l, no edema     LABS:                        11.5   9.60  )-----------( 207      ( 24 May 2024 22:00 )             35.4     05-24    138  |  105  |  10  ----------------------------<  115<H>  3.8   |  21<L>  |  1.00    Ca    8.9      24 May 2024 22:00    TPro  7.1  /  Alb  4.0  /  TBili  0.4  /  DBili  x   /  AST  16  /  ALT  10  /  AlkPhos  59  05-24    PT/INR - ( 24 May 2024 22:00 )   PT: 11.5 sec;   INR: 1.10 ratio    PTT - ( 24 May 2024 22:00 )  PTT:29.1 sec    I&O's Detail  Urinalysis Basic - ( 24 May 2024 22:00 )    Color: x / Appearance: x / SG: x / pH: x  Gluc: 115 mg/dL / Ketone: x  / Bili: x / Urobili: x   Blood: x / Protein: x / Nitrite: x   Leuk Esterase: x / RBC: x / WBC x   Sq Epi: x / Non Sq Epi: x / Bacteria: x        RADIOLOGY & ADDITIONAL STUDIES:     AARON FIELD  38y  Female [id number]    HPI: Patient is a 37yo  at 4w3d by LMP  presenting for the second time to the ED for 2 day history of severe right sided abdominal pain in the setting of a PUL, with a highly desired pregnancy. Pain is associated with nausea, emesis x4, subjective fevers/chills, loss of appetite, constipation (no BMs for 3-4 days) and inability to pass flatus for 3 days. Pt's normal bowel movement pattern is either every day or every other day. She has had no vaginal bleeding or spotting. Denies current dizziness, lightheadedness, CP, SOB.    Patient did not know she was pregnant when she presented to the ED yesterday, as she is only now approximately due for her May menses. Pt reports one episode of severe diffuse abdominal pain 2 weeks ago that lasted 1 hour and spontaneously resolved. Pt has not experienced this type of abdominal pain before in her life.    Name of GYN Physician: none  OBHx: medical TOP x1  GynHx: Denies known fibroids, cysts, endometriosis, STI's, Abnormal pap smears   PMH: none  PSH: none  Meds: none  Allx: NKDA    Vital Signs Last 24 Hrs  T(C): 36.5 (24 May 2024 21:10), Max: 36.5 (24 May 2024 21:10)  T(F): 97.7 (24 May 2024 21:10), Max: 97.7 (24 May 2024 21:10)  HR: 67 (24 May 2024 23:50) (66 - 88)  BP: 112/81 (24 May 2024 23:50) (109/74 - 123/79)  RR: 18 (24 May 2024 23:50) (18 - 18)  SpO2: 100% (24 May 2024 23:50) (100% - 100%)    Parameters below as of 24 May 2024 23:50  Patient On (Oxygen Delivery Method): room air    Physical Exam: Wilma MAYER  General: laying comfortably in bed sleeping upon entering the room, gently awoken, then uncomfortable appearing and fidgety while being interviewed  HEENT: neck supple, full ROM  CV: extremities well perfused  Lungs: normal respiratory effort on room air  Abd: Softly distended and tympanic to percussion L>R. Minimal tenderness to palpation of RLQ. No rebound tenderness or guarding  : External labia wnl. Bimanual exam with no cervical motion tenderness, uterus bulky and mobile approx 14 wks size, fullness appreciated posteriorly, adnexa non palpable and nontender b/l.  Cervix closed.  Speculum Exam: deferred  Ext: non-tender b/l, no edema     LABS:                        11.5   9.60  )-----------( 207      ( 24 May 2024 22:00 )             35.4         138  |  105  |  10  ----------------------------<  115<H>  3.8   |  21<L>  |  1.00    Ca    8.9      24 May 2024 22:00    TPro  7.1  /  Alb  4.0  /  TBili  0.4  /  DBili  x   /  AST  16  /  ALT  10  /  AlkPhos  59      PT/INR - ( 24 May 2024 22:00 )   PT: 11.5 sec;   INR: 1.10 ratio    PTT - ( 24 May 2024 22:00 )  PTT:29.1 sec    I&O's Detail  Urinalysis Basic - ( 24 May 2024 22:00 )    Color: x / Appearance: x / SG: x / pH: x  Gluc: 115 mg/dL / Ketone: x  / Bili: x / Urobili: x   Blood: x / Protein: x / Nitrite: x   Leuk Esterase: x / RBC: x / WBC x   Sq Epi: x / Non Sq Epi: x / Bacteria: x    RADIOLOGY & ADDITIONAL STUDIES:  < from: US OB <=14 Weeks, First Gestation (24 @ 23:11) >    ACC: [id number] EXAM:  US DPLX PELVIC   ORDERED BY:  KARAN CAI   ACC: [id number] EXAM:  US OB LES THAN 14 WKS 1ST GEST   ORDERED BY:  KARAN CAI     ACC: [id number] EXAM:  US OB TRANSVAGINAL   ORDERED BY:  KARAN CAI     PROCEDURE DATE:2024      INTERPRETATION:  CLINICAL INFORMATION: Pelvic pain. Concern for ectopic   pregnancy.    LMP: 2024    Beta hCG on 2024: 1690, previously 899 on 2024    Estimated Gestational Age by LMP: 4 weeks 2 days    COMPARISON:Pelvic ultrasound 2024    Endovaginal pelvic sonogram as per order. Transabdominal pelvic sonogram   was also performed as part of our standard protocol. Color and Spectral   Doppler was performed.    FINDINGS:  Uterus: Enlarged uterus [id number] x 9.6 x 6.9 cm. Multiple fibroids   measuring up to 9.8 x 7.7 x 8.7 cm in the posterior intramural/subserosal   space. The endometrium measures 2.3 cm. 1.2 x 0.5 x 1 cm cystic area   within the endometrial cavity. No definitive evidence of intrauterine   pregnancy. Additional hyperechoic ovoid structure again seen within the   endometrium.    Right ovary: 2.2 cm x 2.6 cm x 2.6 cm. There is a 1.4 x 1.8 x 1.8 cm   corpus luteum. The arterial waveforms are documented.  Left ovary: 2.9 cm x 1.9 cm x 1.8 cm. Within normal limits. Arterial and   venous waveforms are documented.    Fluid: Small amount of free fluid in the cul-de-sac and right adnexa    IMPRESSION:  Stable exam. No evidence of intrauterine or extrauterine pregnancy at   this time. These findings represent a pregnancy of unknown location.   Short interval follow-up sonography and serial beta hCG is recommended to   assess for intrauterine pregnancy and exclude ectopic pregnancy.    Multiple uterine fibroids, similar to prior.    --- End of Report ---    JEFFREY BENNETT MD; Resident Radiologist  This document has been electronically signed.  PIEDAD SCHUSTER MD; Attending Radiologist  This document has been electronically signed. May 25 2024 12:47AM    < end of copied text >       AARON FIELD  38y  Female [id number]    HPI: Patient is a 39yo  at 4w3d by LMP  presenting for the second time to the ED for 2 day history of severe right sided abdominal pain in the setting of a PUL, with a highly desired pregnancy. Pain is associated with nausea, emesis x4, subjective fevers/chills, loss of appetite, constipation (no BMs for 3-4 days) and inability to pass flatus for 3 days. Pt's normal bowel movement pattern is either every day or every other day. She has had no vaginal bleeding or spotting. Denies current dizziness, lightheadedness, CP, SOB.    Patient did not know she was pregnant when she presented to the ED yesterday, as she is only now approximately due for her May menses. Pt reports one episode of severe diffuse abdominal pain 2 weeks ago that lasted 1 hour and spontaneously resolved. Pt has not experienced this type of abdominal pain before in her life.    Name of GYN Physician: none  OBHx: medical TOP x1  GynHx: Denies known fibroids, cysts, endometriosis, STI's, Abnormal pap smears   PMH: none  PSH: none  Meds: none  Allx: NKDA    Vital Signs Last 24 Hrs  T(C): 36.5 (24 May 2024 21:10), Max: 36.5 (24 May 2024 21:10)  T(F): 97.7 (24 May 2024 21:10), Max: 97.7 (24 May 2024 21:10)  HR: 67 (24 May 2024 23:50) (66 - 88)  BP: 112/81 (24 May 2024 23:50) (109/74 - 123/79)  RR: 18 (24 May 2024 23:50) (18 - 18)  SpO2: 100% (24 May 2024 23:50) (100% - 100%)    Parameters below as of 24 May 2024 23:50  Patient On (Oxygen Delivery Method): room air    Physical Exam: Wilma MAYER  General: laying comfortably in bed sleeping upon entering the room, gently awoken, then uncomfortable appearing and fidgety while being interviewed  HEENT: neck supple, full ROM  CV: extremities well perfused  Lungs: normal respiratory effort on room air  Abd: Softly distended and tympanic to percussion L>R. Minimal tenderness to palpation of RLQ. No rebound tenderness or guarding  : External labia wnl. Bimanual exam with no cervical motion tenderness, uterus bulky and mobile approx 14 wks size, fullness appreciated posteriorly, adnexa non palpable and nontender b/l.  Cervix closed.  Speculum Exam: deferred  Ext: non-tender b/l, no edema     LABS:                        11.5   9.60  )-----------( 207      ( 24 May 2024 22:00 )             35.4         138  |  105  |  10  ----------------------------<  115<H>  3.8   |  21<L>  |  1.00    Ca    8.9      24 May 2024 22:00    TPro  7.1  /  Alb  4.0  /  TBili  0.4  /  DBili  x   /  AST  16  /  ALT  10  /  AlkPhos  59      PT/INR - ( 24 May 2024 22:00 )   PT: 11.5 sec;   INR: 1.10 ratio    PTT - ( 24 May 2024 22:00 )  PTT:29.1 sec    I&O's Detail  Urinalysis Basic - ( 24 May 2024 22:00 )    Color: x / Appearance: x / SG: x / pH: x  Gluc: 115 mg/dL / Ketone: x  / Bili: x / Urobili: x   Blood: x / Protein: x / Nitrite: x   Leuk Esterase: x / RBC: x / WBC x   Sq Epi: x / Non Sq Epi: x / Bacteria: x    RADIOLOGY & ADDITIONAL STUDIES:  < from: US OB <=14 Weeks, First Gestation (24 @ 23:11) >    ACC: [id number] EXAM:  US DPLX PELVIC   ORDERED BY:  KARAN CAI   ACC: [id number] EXAM:  US OB LES THAN 14 WKS 1ST GEST   ORDERED BY:  KARAN CAI     ACC: [id number] EXAM:  US OB TRANSVAGINAL   ORDERED BY:  KARAN CAI     PROCEDURE DATE:2024      INTERPRETATION:  CLINICAL INFORMATION: Pelvic pain. Concern for ectopic   pregnancy.    LMP: 2024    Beta hCG on 2024: 1690, previously 899 on 2024    Estimated Gestational Age by LMP: 4 weeks 2 days    COMPARISON:Pelvic ultrasound 2024    Endovaginal pelvic sonogram as per order. Transabdominal pelvic sonogram   was also performed as part of our standard protocol. Color and Spectral   Doppler was performed.    FINDINGS:  Uterus: Enlarged uterus [id number] x 9.6 x 6.9 cm. Multiple fibroids   measuring up to 9.8 x 7.7 x 8.7 cm in the posterior intramural/subserosal   space. The endometrium measures 2.3 cm. 1.2 x 0.5 x 1 cm cystic area   within the endometrial cavity. No definitive evidence of intrauterine   pregnancy. Additional hyperechoic ovoid structure again seen within the   endometrium.    Right ovary: 2.2 cm x 2.6 cm x 2.6 cm. There is a 1.4 x 1.8 x 1.8 cm   corpus luteum. The arterial waveforms are documented.  Left ovary: 2.9 cm x 1.9 cm x 1.8 cm. Within normal limits. Arterial and   venous waveforms are documented.    Fluid: Small amount of free fluid in the cul-de-sac and right adnexa    IMPRESSION:  Stable exam. No evidence of intrauterine or extrauterine pregnancy at   this time. These findings represent a pregnancy of unknown location.   Short interval follow-up sonography and serial beta hCG is recommended to   assess for intrauterine pregnancy and exclude ectopic pregnancy.    Multiple uterine fibroids, similar to prior.    --- End of Report ---    JEFFREY BENNETT MD; Resident Radiologist  This document has been electronically signed.  PIEDAD SCHUSTER MD; Attending Radiologist  This document has been electronically signed. May 25 2024 12:47AM    < end of copied text >    < from: MR Pelvis No Cont (24 @ 05:48) >    ACC: [id number] EXAM:  MR PELVIS   ORDERED BY:  KARAN CAI     ACC: [id number] EXAM:  MR ABDOMEN   ORDERED BY:  KARAN CAI     PROCEDURE DATE:  2024          INTERPRETATION:  CLINICAL INDICATION: Patient pregnant with abdominal   pain.    TECHNIQUE: Multiecho, multiplanar magnetic resonance imaging of the   abdomen and pelvis was performed without intravenous contrast.    COMPARISON: Pelvic ultrasound 2024.    FINDINGS:    Enlarged uterus containing multiple space occupying lesions, likely   fibroids some of which appears degenerating. Prominent fluid-filled   endometrium containing too small to characterize cystic focus.    The appendix is normal. The visualized bowel is normal.    Mild pelvic free fluid.    There is fullness of right renal collecting system, likely related to   pregnancy. Correlate with urinalysis and lab values. 1.8 cm splenic cyst.   Too small to characterize cystic foci in the liver. Remaining visualized   portions of the solid abdominal viscera demonstrate normal signal   intensity.    Bone marrow signal intensity is normal.    IMPRESSION:    No MRI findings of acute appendicitis.    Enlarged uterus containing multiple space occupying lesions, likely   fibroids some of which appears degeneratingas better delineated on   recent pelvic ultrasound    Prominent fluid-filled endometrium containing too small to characterize   cystic focus. Differential remains early pregnancy, missed  or   ectopic pregnancy. Correlate with serial beta HCGand short-term imaging   follow-up is advised.    Mild pelvic free fluid.    --- End of Report ---      < end of copied text >

## 2024-05-25 NOTE — ED ADULT NURSE REASSESSMENT NOTE - NS ED NURSE REASSESS COMMENT FT1
Pt received from VICTORIA Haskins. Pt oriented to CDU & plan of care was discussed. Pt A&O x 4. Pt in CDU for IVF, pain control, ob/gyn recs. Pt denies any nausea, vomiting. Pt c/o 6/10 abdominal pain, medicated as per MAR. V/S stable, pt afebrile,  IV in place, patent and free of signs of infiltration. Pt resting in bed. Safety & comfort measures maintained. Call bell in reach. Will continue to monitor.

## 2024-05-25 NOTE — ED CDU PROVIDER DISPOSITION NOTE - ATTENDING APP SHARED VISIT CONTRIBUTION OF CARE
Attending MD Dimas.  Agree with above.  Pt without newly actionable findings, pain improved.  Tolerating PO in CDU.  Stable for discharge home with strict return precautions.

## 2024-05-25 NOTE — CHART NOTE - NSCHARTNOTEFT_GEN_A_CORE
INCOMPLETE  R2 GYN Chart Note    Pt seen and reassessed at bedside. Pt reporting continued severe abdominal pain that is exacerbated by movement. Pt reports Tylenol helps with pain for a brief time only. Pt reports continued nausea and poor appetite. VSS.    -Given pt's persistent pain despite multiple doses of Tylenol/narcotics and MRI demonstrating large 9.8cm fibroid with features suggesting degeneration, discussed PO Indocin trial with pt. Discussed with pt risks/benefits/alternatives of NSAID use in early pregnancy; data is   -Recommend Indocin 50mg PO for suspected degenerating fibroid; pending pt's pain after medication will consider discharging pt on short course of Indocin  -Rest of recs as per consult note  -GYN will continue to follow and reassess pain PRN, please contact at #[phone] with any concerns    D/w Dr. Trejo, OB service attending  Christophe PGY2 INCOMPLETE  R2 GYN Chart Note    Pt seen and reassessed at bedside. Pt reporting continued severe abdominal pain that is exacerbated by movement. Pt reports Tylenol helps with pain for a brief time only. Pt reports continued nausea and poor appetite. VSS.    -Given pt's persistent pain despite multiple doses of Tylenol/narcotics and MRI demonstrating large 9.8cm fibroid with features suggesting degeneration, discussed PO Indocin trial with pt. Discussed with pt risks/benefits/alternatives of NSAID use in early pregnancy. "FDA is warning that using pain-relieving and fever-reducing nonsteroidal anti-inflammatory drugs (NSAIDs) around 20 weeks or later in pregnancy may cause kidney problems in the unborn baby, which can lead to low levels of amniotic fluid that surrounds the baby. This fluid provides a protective cushion and helps the unborn babies’ lungs, digestive system, and muscles develop. Complications can occur with low levels of this fluid." "NSAIDs should not be used during the first two trimesters of pregnancy unless the expected benefits to the mother outweigh the risks to the fetus. If there is a compelling need for NSAID treatment during the first or second trimester, limit use to the lowest effective dose and shortest duration possible." Pt opting to proceed with Indocin trial at this time.  -Recommend Indocin 50mg PO for suspected degenerating fibroid; pending pt's pain after medication will consider discharging pt on short course of Indocin  -Rest of recs as per consult note  -GYN will continue to follow and reassess pain PRN, please contact at #[phone] with any concerns    D/w Dr. Trejo, OB service attending  VTviolet PGY2 R2 GYN Chart Note    Pt seen and reassessed at bedside. Pt reporting continued severe abdominal pain that is exacerbated by movement. Pt reports Tylenol helps with pain for a brief time only. Pt reports continued nausea and poor appetite. VSS.    Given pt's persistent pain despite multiple doses of Tylenol/narcotics and MRI demonstrating large 9.8cm fibroid with features suggesting degeneration, discussed PO Indocin trial with pt. Discussed with pt risks/benefits/alternatives of NSAID use in early pregnancy. "FDA is warning that using pain-relieving and fever-reducing nonsteroidal anti-inflammatory drugs (NSAIDs) around 20 weeks or later in pregnancy may cause kidney problems in the unborn baby, which can lead to low levels of amniotic fluid that surrounds the baby. This fluid provides a protective cushion and helps the unborn babies’ lungs, digestive system, and muscles develop. Complications can occur with low levels of this fluid." "NSAIDs should not be used during the first two trimesters of pregnancy unless the expected benefits to the mother outweigh the risks to the fetus. If there is a compelling need for NSAID treatment during the first or second trimester, limit use to the lowest effective dose and shortest duration possible." Pt opting to proceed with Indocin trial at this time.    -Recommend Indocin 50mg PO for suspected degenerating fibroid; pending pt's pain after medication will consider discharging pt on short course of Indocin  -Rest of recs as per consult note  -GYN will continue to follow and reassess pain PRN, please contact at #[phone] with any concerns    D/w Dr. Trejo, OB service attending  Christophe PGY2 R2 GYN Chart Note    Pt seen and reassessed at bedside. Pt reporting continued severe abdominal pain that is exacerbated by movement. Pt reports Tylenol helps with pain for a brief time only. Pt reports continued nausea and poor appetite. VSS.    Given pt's persistent pain despite multiple doses of Tylenol/narcotics and MRI demonstrating large 9.8cm fibroid with features suggesting degeneration, discussed PO Indocin trial with pt. Discussed with pt risks/benefits/alternatives of NSAID use in early pregnancy. "FDA is warning that using pain-relieving and fever-reducing nonsteroidal anti-inflammatory drugs (NSAIDs) around 20 weeks or later in pregnancy may cause kidney problems in the unborn baby, which can lead to low levels of amniotic fluid that surrounds the baby. This fluid provides a protective cushion and helps the unborn babies’ lungs, digestive system, and muscles develop. Complications can occur with low levels of this fluid." "NSAIDs should not be used during the first two trimesters of pregnancy unless the expected benefits to the mother outweigh the risks to the fetus. If there is a compelling need for NSAID treatment during the first or second trimester, limit use to the lowest effective dose and shortest duration possible." Pt opting to proceed with Indocin trial at this time.    -Recommend Indocin 50mg PO for suspected degenerating fibroid  -Rest of recs as per consult note  -GYN will continue to follow and reassess pain PRN, please contact at #[phone] with any concerns    D/w Dr. Trejo, OB service attending  Christophe PGY2    Addendum:  Per ED, pt reports feeling better after Indocin and would like to be discharged. Recommend f/u 5/26 for repeat bHCG in setting of PUL as discussed previously.    D/w Dr. Maya Saeed PGY2

## 2024-05-25 NOTE — ED CDU PROVIDER DISPOSITION NOTE - CLINICAL COURSE
39 y/o female  at 4w3d by LMP  presenting for the second time to the ED for 2 day history of severe right sided abdominal pain in the setting of a pregnancy of unknown location. Pain is associated with nausea, emesis x4, subjective fevers/chills, loss of appetite, constipation (no BMs for 3-4 days) and inability to pass flatus for 3 days. Pt's normal bowel movement pattern is either every day or every other day. She has had no vaginal bleeding or spotting. Denies current dizziness, lightheadedness, CP, SOB. Patient did not know she was pregnant when she presented to the ED yesterday, as she is only now approximately due for her May menses. Pt reports one episode of severe diffuse abdominal pain 2 weeks ago that lasted 1 hour and spontaneously resolved. Pt has not experienced this type of abdominal pain before in her life.  HCG 1690, US findings represent a pregnancy of unknown location. MRI showed Enlarged uterus containing multiple space occupying lesions, likely fibroids. OB recommended pain control and repeat beta HCG     Sent to CDU for pain control

## 2024-05-25 NOTE — ED CDU PROVIDER INITIAL DAY NOTE - ATTENDING APP SHARED VISIT CONTRIBUTION OF CARE
Attending MD Dimas.  Pt signed out to me in stable condition pending reassess, dispo, 37 yo fem with desired preg of unknown location now s/p visit 48 hrs with no clarity, worsening abd'l pain, sono with unknown fluid, now s/p MRI with trace free fluid.  Pt reassessed and endorsing sxs improvement with ofirmev.  She has strong concern re: return of pain.  Prolonged discussion re: results of blood/urine/sono/HCG level and MRI.  Counseled re: fibroids/pain control, free pelvic fluid as well as interpretation of HCG level:radiographic findings.  Pt verbalizes understanding of need for repeat beta HCG and understanding of ectopic pregnancy potential with need for confirmation of IUP for safety.  Return to ED for new/worsening sxs including severe or worsened pain, change in nature of pain or development of new sxs not currently experienced.  Rx for antiemetic, f/u with OB.  Shortly after this discussion pt's pain returned and she prefers overnight stay in CDU/obs unit.  Will transfer care to CDU/obs unit for supportive care.

## 2024-05-25 NOTE — ED CDU PROVIDER INITIAL DAY NOTE - OBJECTIVE STATEMENT
39 y/o female  at 4w3d by LMP  presenting for the second time to the ED for 2 day history of severe right sided abdominal pain in the setting of a pregnancy of unknown location. Pain is associated with nausea, emesis x4, subjective fevers/chills, loss of appetite, constipation (no BMs for 3-4 days) and inability to pass flatus for 3 days. Pt's normal bowel movement pattern is either every day or every other day. She has had no vaginal bleeding or spotting. Denies current dizziness, lightheadedness, CP, SOB.    Patient did not know she was pregnant when she presented to the ED yesterday, as she is only now approximately due for her May menses. Pt reports one episode of severe diffuse abdominal pain 2 weeks ago that lasted 1 hour and spontaneously resolved. Pt has not experienced this type of abdominal pain before in her life.

## 2024-05-25 NOTE — ED CDU PROVIDER INITIAL DAY NOTE - PROGRESS NOTE DETAILS
MORGAN Paniagua: GYN resident called stating to trial indocin 50mg. GYN had risk benefit discussion with patient. ED attending Dr Dimas agreed with above MORGAN Paniagua: GYN resident called stating to trial Indocin 50mg. GYN had risk benefit discussion with patient. ED attending Dr Dimas agreed with above MORGAN Paniagua: patient reported pain is well controlled after Indocin and requesting discharge home. GYN Alicia aware and agreed to be discharged home with Tylenol only and pt has to return to ED tomorrow for repeat beta hcg testing. ED attending Dr Dimas aware of above and agreed

## 2024-05-25 NOTE — ED CDU PROVIDER DISPOSITION NOTE - NSFOLLOWUPINSTRUCTIONS_ED_ALL_ED_FT
Follow up with your AS PER OB/GYN      Bring a copy of your test results with you to your appointment  Continue your current medication regimen  Return to the Emergency Room if you experience new or worsening symptoms abdominal pain, nausea, vomiting, fever chills, cough, chest pain, shortness of breath, dizziness, slurred speech, weakness, gait abnormality Follow up with your AS PER OB/GYN RETURN TO THE EMERGENCY DEPARTMENT TOMORROW 5/26/24 FOR REPEAT BETA HCG TESTING      Bring a copy of your test results with you to your appointment  Continue your current medication regimen  Return to the Emergency Room if you experience new or worsening symptoms abdominal pain, nausea, vomiting, fever chills, cough, chest pain, shortness of breath, dizziness, slurred speech, weakness, gait abnormality

## 2024-05-25 NOTE — ED ADULT NURSE REASSESSMENT NOTE - NS ED NURSE REASSESS COMMENT FT1
Received report from night VICTORIA Flores. Upon assessment, A&Ox4, denies chest pain, SOB, n/v. Resting comfortably in bed. Vitals WNL. Awaiting GYN.

## 2024-05-25 NOTE — CONSULT NOTE ADULT - ASSESSMENT
Patient is a 37yo  at 4w3d by LMP  presenting for the second time to the ED for 2 day history of severe right sided abdominal pain in the setting of a PUL, with a highly desired pregnancy. Pain is associated with nausea, emesis x4, subjective fevers/chills, loss of appetite, constipation (no BMs for 3-4 days) and inability to pass flatus for 3 days. Patient has an appropriately rising bHCG 899.1 ()->1690 () in less than 48hrs. TVUS showing an enlarged fibroid uterus with no definitive IUP or suspicious adnexal mass, trace RA/CDS free fluid. Patient with minimal tenderness on exam, though seen at bedside after multiple pushes of Fentanyl.    - Patient with several symptoms that are not suggestive of ectopic pregnancy or ruptured ectopic pregnancy  - In the setting of an appropriately rising bHCG, lack of bleeding or spotting, enlarged fibroid uterus that can make visualizing an intrauterine pregnancy more difficult at early stages, would not recommend any interventions including MTX or Dx LSC at this time. Differential still includes ectopic pregnancy, though GYN team with higher suspicion for early IUP.  - Recommend working up alternative diagnoses. Can consider US appendix, abdominal xray, and/or abdominal MRI per primary ED team's workup  - Patient will still require repeat bHCG and TVUS in ED in 48hours (, ) to continue following her pregnancy of unknown location  - GYN to continue to follow closely    D/w GYN service attending, Dr. John Leon PGY2 A/P: Patient is a 39yo  at 4w3d by LMP  presenting for the second time to the ED for 2 day history of severe right sided abdominal pain in the setting of a PUL, with a highly desired pregnancy. Pain is associated with nausea, emesis x4, subjective fevers/chills, loss of appetite, constipation (no BMs for 3-4 days) and inability to pass flatus for 3 days. Patient has an appropriately rising bHCG 899.1 ()->1690 () in less than 48hrs. TVUS showing an enlarged fibroid uterus with no definitive IUP or suspicious adnexal mass, trace RA/CDS free fluid. Patient with minimal tenderness on exam, though seen at bedside after multiple pushes of Fentanyl.    - Patient with several symptoms that are not suggestive of ectopic pregnancy or ruptured ectopic pregnancy  - In the setting of an appropriately rising bHCG, lack of bleeding or spotting, enlarged fibroid uterus that can make visualizing an intrauterine pregnancy more difficult at early stages, would not recommend any interventions including MTX or Dx LSC at this time. Differential still includes ectopic pregnancy, though GYN team with higher suspicion for early IUP.  - Recommend working up alternative diagnoses. Can consider US appendix, abdominal xray, and/or abdominal MRI per primary ED team's workup  - Patient will still require repeat bHCG and TVUS in ED in 48hours (, ) to continue following her pregnancy of unknown location  - GYN to continue to follow closely    D/w GYN service attending, Dr. John Leon PGY2    Addendum:  MRI reviewed. No findings of acute appendicitis. Enlarged uterus with multiple space-occupying lesions, likely fibroids some of which appear degenerating. Prominent fluid-filled endometrium with small cystic focus.     -Recommendations as above (repeat bHCG/TVUS in ED in 48h, return precautions for ectopic pregnancy including sudden severe abdominal pain, dizzy spells/syncope, inability to tolerate PO)  -Rest of management as per primary ED team    GYN service attending made aware  Christophe PGY2

## 2024-05-26 ENCOUNTER — EMERGENCY (EMERGENCY)
Facility: HOSPITAL | Age: 39
LOS: 1 days | Discharge: HOME | End: 2024-05-26
Attending: STUDENT IN AN ORGANIZED HEALTH CARE EDUCATION/TRAINING PROGRAM
Payer: COMMERCIAL

## 2024-05-26 VITALS
RESPIRATION RATE: 17 BRPM | HEART RATE: 90 BPM | WEIGHT: 139.99 LBS | OXYGEN SATURATION: 99 % | DIASTOLIC BLOOD PRESSURE: 73 MMHG | HEIGHT: 65 IN | TEMPERATURE: 98 F | SYSTOLIC BLOOD PRESSURE: 115 MMHG

## 2024-05-26 VITALS
SYSTOLIC BLOOD PRESSURE: 111 MMHG | DIASTOLIC BLOOD PRESSURE: 74 MMHG | HEART RATE: 78 BPM | RESPIRATION RATE: 16 BRPM | OXYGEN SATURATION: 99 % | TEMPERATURE: 99 F

## 2024-05-26 LAB
ALBUMIN SERPL ELPH-MCNC: 3.9 G/DL — SIGNIFICANT CHANGE UP (ref 3.3–5)
ALP SERPL-CCNC: 57 U/L — SIGNIFICANT CHANGE UP (ref 40–120)
ALT FLD-CCNC: 8 U/L — LOW (ref 10–45)
ANION GAP SERPL CALC-SCNC: 11 MMOL/L — SIGNIFICANT CHANGE UP (ref 5–17)
APPEARANCE UR: CLEAR — SIGNIFICANT CHANGE UP
APTT BLD: 29.2 SEC — SIGNIFICANT CHANGE UP (ref 24.5–35.6)
AST SERPL-CCNC: 12 U/L — SIGNIFICANT CHANGE UP (ref 10–40)
BACTERIA # UR AUTO: NEGATIVE /HPF — SIGNIFICANT CHANGE UP
BASOPHILS # BLD AUTO: 0.02 K/UL — SIGNIFICANT CHANGE UP (ref 0–0.2)
BASOPHILS NFR BLD AUTO: 0.2 % — SIGNIFICANT CHANGE UP (ref 0–2)
BILIRUB SERPL-MCNC: 0.5 MG/DL — SIGNIFICANT CHANGE UP (ref 0.2–1.2)
BILIRUB UR-MCNC: NEGATIVE — SIGNIFICANT CHANGE UP
BLD GP AB SCN SERPL QL: NEGATIVE — SIGNIFICANT CHANGE UP
BUN SERPL-MCNC: 13 MG/DL — SIGNIFICANT CHANGE UP (ref 7–23)
CALCIUM SERPL-MCNC: 8.8 MG/DL — SIGNIFICANT CHANGE UP (ref 8.4–10.5)
CAST: 0 /LPF — SIGNIFICANT CHANGE UP (ref 0–4)
CHLORIDE SERPL-SCNC: 102 MMOL/L — SIGNIFICANT CHANGE UP (ref 96–108)
CO2 SERPL-SCNC: 22 MMOL/L — SIGNIFICANT CHANGE UP (ref 22–31)
COLOR SPEC: YELLOW — SIGNIFICANT CHANGE UP
CREAT SERPL-MCNC: 1.18 MG/DL — SIGNIFICANT CHANGE UP (ref 0.5–1.3)
DIFF PNL FLD: NEGATIVE — SIGNIFICANT CHANGE UP
EGFR: 61 ML/MIN/1.73M2 — SIGNIFICANT CHANGE UP
EOSINOPHIL # BLD AUTO: 0.09 K/UL — SIGNIFICANT CHANGE UP (ref 0–0.5)
EOSINOPHIL NFR BLD AUTO: 1 % — SIGNIFICANT CHANGE UP (ref 0–6)
GLUCOSE SERPL-MCNC: 100 MG/DL — HIGH (ref 70–99)
GLUCOSE UR QL: NEGATIVE MG/DL — SIGNIFICANT CHANGE UP
HCG SERPL-ACNC: 2766 MIU/ML — HIGH
HCT VFR BLD CALC: 33.4 % — LOW (ref 34.5–45)
HGB BLD-MCNC: 11.2 G/DL — LOW (ref 11.5–15.5)
IMM GRANULOCYTES NFR BLD AUTO: 0.2 % — SIGNIFICANT CHANGE UP (ref 0–0.9)
INR BLD: 1.05 RATIO — SIGNIFICANT CHANGE UP (ref 0.85–1.18)
KETONES UR-MCNC: ABNORMAL MG/DL
LEUKOCYTE ESTERASE UR-ACNC: NEGATIVE — SIGNIFICANT CHANGE UP
LYMPHOCYTES # BLD AUTO: 1.87 K/UL — SIGNIFICANT CHANGE UP (ref 1–3.3)
LYMPHOCYTES # BLD AUTO: 20.8 % — SIGNIFICANT CHANGE UP (ref 13–44)
MCHC RBC-ENTMCNC: 27.1 PG — SIGNIFICANT CHANGE UP (ref 27–34)
MCHC RBC-ENTMCNC: 33.5 GM/DL — SIGNIFICANT CHANGE UP (ref 32–36)
MCV RBC AUTO: 80.7 FL — SIGNIFICANT CHANGE UP (ref 80–100)
MONOCYTES # BLD AUTO: 0.86 K/UL — SIGNIFICANT CHANGE UP (ref 0–0.9)
MONOCYTES NFR BLD AUTO: 9.6 % — SIGNIFICANT CHANGE UP (ref 2–14)
NEUTROPHILS # BLD AUTO: 6.11 K/UL — SIGNIFICANT CHANGE UP (ref 1.8–7.4)
NEUTROPHILS NFR BLD AUTO: 68.2 % — SIGNIFICANT CHANGE UP (ref 43–77)
NITRITE UR-MCNC: NEGATIVE — SIGNIFICANT CHANGE UP
NRBC # BLD: 0 /100 WBCS — SIGNIFICANT CHANGE UP (ref 0–0)
PH UR: 5.5 — SIGNIFICANT CHANGE UP (ref 5–8)
PLATELET # BLD AUTO: 158 K/UL — SIGNIFICANT CHANGE UP (ref 150–400)
POTASSIUM SERPL-MCNC: 3.7 MMOL/L — SIGNIFICANT CHANGE UP (ref 3.5–5.3)
POTASSIUM SERPL-SCNC: 3.7 MMOL/L — SIGNIFICANT CHANGE UP (ref 3.5–5.3)
PROT SERPL-MCNC: 6.9 G/DL — SIGNIFICANT CHANGE UP (ref 6–8.3)
PROT UR-MCNC: NEGATIVE MG/DL — SIGNIFICANT CHANGE UP
PROTHROM AB SERPL-ACNC: 11.5 SEC — SIGNIFICANT CHANGE UP (ref 9.5–13)
RBC # BLD: 4.14 M/UL — SIGNIFICANT CHANGE UP (ref 3.8–5.2)
RBC # FLD: 14 % — SIGNIFICANT CHANGE UP (ref 10.3–14.5)
RBC CASTS # UR COMP ASSIST: 0 /HPF — SIGNIFICANT CHANGE UP (ref 0–4)
RH IG SCN BLD-IMP: POSITIVE — SIGNIFICANT CHANGE UP
SODIUM SERPL-SCNC: 135 MMOL/L — SIGNIFICANT CHANGE UP (ref 135–145)
SP GR SPEC: 1.02 — SIGNIFICANT CHANGE UP (ref 1–1.03)
SQUAMOUS # UR AUTO: 2 /HPF — SIGNIFICANT CHANGE UP (ref 0–5)
UROBILINOGEN FLD QL: 0.2 MG/DL — SIGNIFICANT CHANGE UP (ref 0.2–1)
WBC # BLD: 8.97 K/UL — SIGNIFICANT CHANGE UP (ref 3.8–10.5)
WBC # FLD AUTO: 8.97 K/UL — SIGNIFICANT CHANGE UP (ref 3.8–10.5)
WBC UR QL: 3 /HPF — SIGNIFICANT CHANGE UP (ref 0–5)

## 2024-05-26 PROCEDURE — 84702 CHORIONIC GONADOTROPIN TEST: CPT

## 2024-05-26 PROCEDURE — 76801 OB US < 14 WKS SINGLE FETUS: CPT

## 2024-05-26 PROCEDURE — 80053 COMPREHEN METABOLIC PANEL: CPT

## 2024-05-26 PROCEDURE — 85730 THROMBOPLASTIN TIME PARTIAL: CPT

## 2024-05-26 PROCEDURE — 86900 BLOOD TYPING SEROLOGIC ABO: CPT

## 2024-05-26 PROCEDURE — 85610 PROTHROMBIN TIME: CPT

## 2024-05-26 PROCEDURE — 93005 ELECTROCARDIOGRAM TRACING: CPT

## 2024-05-26 PROCEDURE — 76770 US EXAM ABDO BACK WALL COMP: CPT | Mod: 26

## 2024-05-26 PROCEDURE — 99285 EMERGENCY DEPT VISIT HI MDM: CPT

## 2024-05-26 PROCEDURE — 85025 COMPLETE CBC W/AUTO DIFF WBC: CPT

## 2024-05-26 PROCEDURE — 81001 URINALYSIS AUTO W/SCOPE: CPT

## 2024-05-26 PROCEDURE — 76770 US EXAM ABDO BACK WALL COMP: CPT

## 2024-05-26 PROCEDURE — 86901 BLOOD TYPING SEROLOGIC RH(D): CPT

## 2024-05-26 PROCEDURE — 76817 TRANSVAGINAL US OBSTETRIC: CPT

## 2024-05-26 PROCEDURE — 99285 EMERGENCY DEPT VISIT HI MDM: CPT | Mod: 25

## 2024-05-26 PROCEDURE — 76817 TRANSVAGINAL US OBSTETRIC: CPT | Mod: 26

## 2024-05-26 PROCEDURE — 76801 OB US < 14 WKS SINGLE FETUS: CPT | Mod: 26,59

## 2024-05-26 PROCEDURE — 86850 RBC ANTIBODY SCREEN: CPT

## 2024-05-26 PROCEDURE — 87086 URINE CULTURE/COLONY COUNT: CPT

## 2024-05-26 RX ORDER — ACETAMINOPHEN 500 MG
1000 TABLET ORAL ONCE
Refills: 0 | Status: DISCONTINUED | OUTPATIENT
Start: 2024-05-26 | End: 2024-05-26

## 2024-05-26 NOTE — ED ADULT NURSE NOTE - OBJECTIVE STATEMENT
37 y/o F A&Ox4 with no significant PMH presents to the ED c/o urinary retention and Hcg check. Pt reports urinary retention for the past 12 hours and lower abdominal pain. Pt reports she presented to the ED 2 days ago for right lower quadrant abdominal pain and had a positive Hcg. Pt reports mild improvement in symptoms. Pt reports taking Tylenol around 5 am. LMP was 4/24. Denies chest pain, SOB, n/v/d, lightheadedness, dizziness, fever, chills. IV access established; 20 G R AC. Patient safety maintained, bed is in lowest position, wheels locked, and side rails raised.

## 2024-05-26 NOTE — ED PROVIDER NOTE - NSFOLLOWUPCLINICS_GEN_ALL_ED_FT
[facility] - Urology  Urology  [address], 3rd & 4th [place] [address]  Phone: [phone]  Fax:   Follow Up Time: 1-3 Days    [facility]  OB-GYN  [address] [place] [address]  Phone: [phone]  Fax:   Follow Up Time: 1-3 Days

## 2024-05-26 NOTE — ED PROVIDER NOTE - PHYSICAL EXAMINATION
CONSTITUTIONAL: awake; in no acute distress.   SKIN: warm, dry  HEAD: Normocephalic; atraumatic.  EYES: no conjunctival injection. no scleral icterus  ENT: No nasal discharge; airway clear.  NECK: Supple; non tender.  CARD: S1, S2 normal; no murmurs, gallops, or rubs. Regular rate and rhythm.   RESP: No wheezes, rales or rhonchi. Good air movement bilaterally.   ABD: soft mild rlq tenderness without rebound, no guarding, no distention, no rigidity. mild R cva tenderness  EXT: No cyanosis or edema.   NEURO: Alert, oriented, grossly unremarkable  PSYCH: Cooperative, appropriate.

## 2024-05-26 NOTE — CHART NOTE - NSCHARTNOTEFT_GEN_A_CORE
R2 GYN Chart Note    Chart reviewed. Pt currently being followed on bHCG list and noted to be in ED today (did not receive GYN consult). Pt presented with urinary retention and Cordero placed, possibly 2/2 bladder obstruction in setting of 9cm posterior fibroid. UA with trace ketones, UCx sent. Renal U/S demonstrating mild R hydronephrosis. Repeat TVUS today demonstrating nonspecific intrauterine fluid collection measuring 3 mm which may represent an early IUP or fluid in the endometrial cavity, with no evidence of extraovarian adnexal mass.    Serum bHCG level currently rising appropriately for possible early IUP. bHC ()->1690 ()->2766 ().    Spoke with pt on telephone and encouraged f/u in 1w to ED for repeat bHCG/TVUS. Return precautions for ectopic pregnancy reviewed with pt including sudden severe abdominal pain, heavy vaginal bleeding saturating 2+ pads in 2+ consecutive hours, dizzy spells/syncope. Questions answered and pt expressed understanding. Pt encouraged by ED to f/u with urology outpatient in setting of urinary retention.    Will continue to follow until IUP confirmed or bHCG is negative.    D/w Dr. Prieto, GYN service attending  [facility] PGY2 R2 GYN Chart Note    Chart reviewed. Pt currently being followed on bHCG list and noted to be in ED today (did not receive GYN consult). Pt presented with urinary retention and Cordero placed, possibly 2/2 bladder obstruction in setting of 9cm posterior fibroid. UA with trace ketones, UCx sent. Renal U/S demonstrating mild R hydronephrosis. Repeat TVUS today demonstrating nonspecific intrauterine fluid collection measuring 3 mm which may represent an early IUP or fluid in the endometrial cavity, with no evidence of extraovarian adnexal mass.    Serum bHCG level currently rising appropriately for possible early IUP. bHC ()->1690 ()->2766 ().    Spoke with pt on telephone and encouraged f/u in 1w to ED for repeat bHCG/TVUS. Denies abdominal pain, heavy vaginal bleeding. Return precautions for ectopic pregnancy reviewed with pt including sudden severe abdominal pain, heavy vaginal bleeding saturating 2+ pads in 2+ consecutive hours, dizzy spells/syncope. Questions answered and pt expressed understanding. Pt encouraged by ED to f/u with urology outpatient in setting of urinary retention.    Will continue to follow until IUP confirmed or bHCG is negative.    D/w Dr. Prieto, GYN service attending  [facility] PGY2

## 2024-05-26 NOTE — ED PROVIDER NOTE - CLINICAL SUMMARY MEDICAL DECISION MAKING FREE TEXT BOX
Patient is a 38-year-old female G0 no past medical history presenting for hCG check and urinary retention. With vital signs within normal limits and stable.  With pregnancy of unknown location at previous visit, now with urinary retention as well.  Bedside ultrasound with 275 cc PVR, evidence of fibroid, urinary retention likely secondary to fibroid obstruction.  Also with pregnancy of unknown location concern for ectopic pregnancy versus early pregnancy.  To evaluate labs, ultrasound, urine, place Cordero and reassess. Patient is a 38-year-old female G0 no past medical history presenting for hCG check and urinary retention. With vital signs within normal limits and stable.  With pregnancy of unknown location at previous visit, now with urinary retention as well.  Bedside ultrasound with 275 cc PVR, evidence of fibroid, urinary retention likely secondary to fibroid obstruction.  Also with pregnancy of unknown location concern for ectopic pregnancy versus early pregnancy.  To evaluate labs, ultrasound, urine, place Cordero and reassess. Josemanuel Barahona MD PGY2

## 2024-05-26 NOTE — ED ADULT NURSE REASSESSMENT NOTE - NS ED NURSE REASSESS COMMENT FT1
Pt. pending results of transvaginal ultrasound. VSS. Patient ambulating independently to nursing station. Provided update by RN (pending results). Patient unhappy with waiting. States she "knows protocol" and "has been waiting for an update for 6 hours." Patient returned from US ~0800, has not been in ED for 6 hours.

## 2024-05-26 NOTE — ED PROVIDER NOTE - PROGRESS NOTE DETAILS
Judson Vergara PA-C: patient signed out by previous team. pending US read at this time. patient evaluated at bedside. offers no complaints at this time. all questions answered. will continue to monitor. Judson Vergara PA-C: spoke with radiology regarding pending US read. states aware of US and will try to read asap. Judson Vergara PA-C: labs and US results reviewed and discussed with patient. advised on importance of following up with her PCP as well as gyn and urology. patient verbalized understanding. well appearing. all questions answers. stable for discharge. discussed with ED attending

## 2024-05-26 NOTE — ED ADULT NURSE REASSESSMENT NOTE - NS ED NURSE REASSESS COMMENT FT1
michael bag changed to small leg bag, pt addie well, (extra large michael bag/clamp and blue chux provided)

## 2024-05-26 NOTE — ED ADULT NURSE REASSESSMENT NOTE - NS ED NURSE REASSESS COMMENT FT1
met with pt sitting at EOB, TVUS results still pending, PA contacted radiology multiple times for results, vitals stable, michael draining clear yellow urine, will change to leg bag at time of d/c home

## 2024-05-26 NOTE — ED PROVIDER NOTE - NSFOLLOWUPINSTRUCTIONS_ED_ALL_ED_FT
1. It is important to follow up with your primary care doctor in 1-2 days    follow up with urology and obgyn in the next 1-2 days.     2. bring a copy of all your results to your follow up appointments    3. you can take Tylenol as needed for pain. you can take 650mg of Tylenol every 6 hours as needed for pain. do not take more than 4000mg in a 24 hour period.     4. if your symptoms worsen, persist, or if any new symptoms develop, or if you experience any signs of distress, return to the ER right away.

## 2024-05-26 NOTE — ED ADULT NURSE REASSESSMENT NOTE - NS ED NURSE REASSESS COMMENT FT1
Cordero catheter placed as MD order. Aseptic technique maintained. Two RNs at bedside during procedure. Catheter draining dark yellow urine. Patient tolerated procedure well.

## 2024-05-26 NOTE — ED PROVIDER NOTE - ATTENDING CONTRIBUTION TO CARE
I have personally performed a face to face medical and diagnostic evaluation of the patient. I have discussed with and reviewed the Resident's note and agree with the History, ROS, Physical Exam and MDM unless otherwise indicated. A brief summary of my personal evaluation and impression can be found below.    38-year-old female  no past medical history presenting for hCG check and urinary retention. With vital signs within normal limits and stable.  With pregnancy of unknown location at previous visit Per chart review, patient's last menstrual period was .   Regarding urinary retention, patient states that she has been having difficulty initiating a stream for some time now, but last night she has not been able to urinate since around 5 PM.   Exam bilateral lower abdominal tenderness to palpation. Bedside ultrasound with 275 cc PVR, evidence of fibroid, urinary retention likely secondary to fibroid obstruction/pregnancy.    For repeat transvaginal ultrasound OB, repeat beta-hCG.  Will also obtain ultrasound of the kidneys and bladder, plan for Cordero placement will send off UA UC.  Reassess to dispo. Dianna Hinds, ED Attending

## 2024-05-26 NOTE — ED PROVIDER NOTE - PATIENT PORTAL LINK FT
You can access the FollowMyHealth Patient Portal offered by [facility] by registering at the following website: [email or web address]. By joining [facility]’s FollowMyHealth portal, you will also be able to view your health information using other applications (apps) compatible with our system.

## 2024-05-26 NOTE — ED PROVIDER NOTE - OBJECTIVE STATEMENT
Patient is a 38-year-old female G0 no past medical history presenting for hCG check and urinary retention.  Patient presented 2 days ago with right lower quadrant abdominal pain, positive hCG, had a transvaginal ultrasound to rule out ectopic and was found to have pregnancy of unknown location.  Told to present today for recheck.  States that in the past 12 hours she has not urinated, feels that she needs to urinate and has tried multiple times.  Says that the right lower quadrant abdominal pain is better than it was 2 days ago.  No back pain.  No fever, chills, nausea, vomiting, vaginal bleeding.  No chest pain, shortness of breath, stool changes.  Took Tylenol at approximately 5 AM today.

## 2024-05-27 LAB
CULTURE RESULTS: NO GROWTH — SIGNIFICANT CHANGE UP
SPECIMEN SOURCE: SIGNIFICANT CHANGE UP

## 2024-05-28 ENCOUNTER — APPOINTMENT (OUTPATIENT)
Dept: UROLOGY | Facility: CLINIC | Age: 39
End: 2024-05-28

## 2024-05-28 ENCOUNTER — APPOINTMENT (OUTPATIENT)
Dept: UROLOGY | Facility: CLINIC | Age: 39
End: 2024-05-28
Payer: COMMERCIAL

## 2024-05-28 VITALS — HEART RATE: 97 BPM | DIASTOLIC BLOOD PRESSURE: 84 MMHG | SYSTOLIC BLOOD PRESSURE: 122 MMHG | OXYGEN SATURATION: 100 %

## 2024-05-28 DIAGNOSIS — Z78.9 OTHER SPECIFIED HEALTH STATUS: ICD-10-CM

## 2024-05-28 PROBLEM — Z00.00 ENCOUNTER FOR PREVENTIVE HEALTH EXAMINATION: Status: ACTIVE | Noted: 2024-05-28

## 2024-05-28 PROCEDURE — 99214 OFFICE O/P EST MOD 30 MIN: CPT

## 2024-05-29 ENCOUNTER — EMERGENCY (EMERGENCY)
Facility: HOSPITAL | Age: 39
LOS: 1 days | Discharge: HOME | End: 2024-05-29
Attending: EMERGENCY MEDICINE | Admitting: EMERGENCY MEDICINE
Payer: COMMERCIAL

## 2024-05-29 VITALS
TEMPERATURE: 99 F | HEART RATE: 89 BPM | SYSTOLIC BLOOD PRESSURE: 127 MMHG | HEIGHT: 65 IN | DIASTOLIC BLOOD PRESSURE: 82 MMHG | RESPIRATION RATE: 16 BRPM | OXYGEN SATURATION: 100 %

## 2024-05-29 VITALS
DIASTOLIC BLOOD PRESSURE: 69 MMHG | OXYGEN SATURATION: 99 % | SYSTOLIC BLOOD PRESSURE: 114 MMHG | TEMPERATURE: 99 F | RESPIRATION RATE: 18 BRPM | HEART RATE: 82 BPM

## 2024-05-29 LAB
ALBUMIN SERPL ELPH-MCNC: 4.4 G/DL — SIGNIFICANT CHANGE UP (ref 3.3–5)
ALP SERPL-CCNC: 66 U/L — SIGNIFICANT CHANGE UP (ref 40–120)
ALT FLD-CCNC: 10 U/L — SIGNIFICANT CHANGE UP (ref 4–33)
ANION GAP SERPL CALC-SCNC: 11 MMOL/L — SIGNIFICANT CHANGE UP (ref 7–14)
APPEARANCE UR: CLEAR — SIGNIFICANT CHANGE UP
AST SERPL-CCNC: 10 U/L — SIGNIFICANT CHANGE UP (ref 4–32)
BACTERIA # UR AUTO: NEGATIVE /HPF — SIGNIFICANT CHANGE UP
BASOPHILS # BLD AUTO: 0.03 K/UL — SIGNIFICANT CHANGE UP (ref 0–0.2)
BASOPHILS NFR BLD AUTO: 0.4 % — SIGNIFICANT CHANGE UP (ref 0–2)
BILIRUB SERPL-MCNC: 0.3 MG/DL — SIGNIFICANT CHANGE UP (ref 0.2–1.2)
BILIRUB UR-MCNC: NEGATIVE — SIGNIFICANT CHANGE UP
BLD GP AB SCN SERPL QL: NEGATIVE — SIGNIFICANT CHANGE UP
BUN SERPL-MCNC: 10 MG/DL — SIGNIFICANT CHANGE UP (ref 7–23)
CALCIUM SERPL-MCNC: 9.3 MG/DL — SIGNIFICANT CHANGE UP (ref 8.4–10.5)
CAST: 0 /LPF — SIGNIFICANT CHANGE UP (ref 0–4)
CHLORIDE SERPL-SCNC: 104 MMOL/L — SIGNIFICANT CHANGE UP (ref 98–107)
CO2 SERPL-SCNC: 24 MMOL/L — SIGNIFICANT CHANGE UP (ref 22–31)
COLOR SPEC: YELLOW — SIGNIFICANT CHANGE UP
CREAT SERPL-MCNC: 0.9 MG/DL — SIGNIFICANT CHANGE UP (ref 0.5–1.3)
DIFF PNL FLD: ABNORMAL
EGFR: 84 ML/MIN/1.73M2 — SIGNIFICANT CHANGE UP
EOSINOPHIL # BLD AUTO: 0.15 K/UL — SIGNIFICANT CHANGE UP (ref 0–0.5)
EOSINOPHIL NFR BLD AUTO: 2.2 % — SIGNIFICANT CHANGE UP (ref 0–6)
GLUCOSE SERPL-MCNC: 104 MG/DL — HIGH (ref 70–99)
GLUCOSE UR QL: NEGATIVE MG/DL — SIGNIFICANT CHANGE UP
HCG SERPL-ACNC: SIGNIFICANT CHANGE UP MIU/ML
HCT VFR BLD CALC: 35.3 % — SIGNIFICANT CHANGE UP (ref 34.5–45)
HGB BLD-MCNC: 11.9 G/DL — SIGNIFICANT CHANGE UP (ref 11.5–15.5)
IANC: 4.04 K/UL — SIGNIFICANT CHANGE UP (ref 1.8–7.4)
IMM GRANULOCYTES NFR BLD AUTO: 0.3 % — SIGNIFICANT CHANGE UP (ref 0–0.9)
KETONES UR-MCNC: NEGATIVE MG/DL — SIGNIFICANT CHANGE UP
LEUKOCYTE ESTERASE UR-ACNC: ABNORMAL
LIDOCAIN IGE QN: 26 U/L — SIGNIFICANT CHANGE UP (ref 7–60)
LYMPHOCYTES # BLD AUTO: 2.02 K/UL — SIGNIFICANT CHANGE UP (ref 1–3.3)
LYMPHOCYTES # BLD AUTO: 30 % — SIGNIFICANT CHANGE UP (ref 13–44)
MCHC RBC-ENTMCNC: 27.4 PG — SIGNIFICANT CHANGE UP (ref 27–34)
MCHC RBC-ENTMCNC: 33.7 GM/DL — SIGNIFICANT CHANGE UP (ref 32–36)
MCV RBC AUTO: 81.1 FL — SIGNIFICANT CHANGE UP (ref 80–100)
MONOCYTES # BLD AUTO: 0.47 K/UL — SIGNIFICANT CHANGE UP (ref 0–0.9)
MONOCYTES NFR BLD AUTO: 7 % — SIGNIFICANT CHANGE UP (ref 2–14)
NEUTROPHILS # BLD AUTO: 4.04 K/UL — SIGNIFICANT CHANGE UP (ref 1.8–7.4)
NEUTROPHILS NFR BLD AUTO: 60.1 % — SIGNIFICANT CHANGE UP (ref 43–77)
NITRITE UR-MCNC: NEGATIVE — SIGNIFICANT CHANGE UP
NRBC # BLD: 0 /100 WBCS — SIGNIFICANT CHANGE UP (ref 0–0)
NRBC # FLD: 0 K/UL — SIGNIFICANT CHANGE UP (ref 0–0)
PH UR: 6 — SIGNIFICANT CHANGE UP (ref 5–8)
PLATELET # BLD AUTO: 252 K/UL — SIGNIFICANT CHANGE UP (ref 150–400)
POTASSIUM SERPL-MCNC: 4.1 MMOL/L — SIGNIFICANT CHANGE UP (ref 3.5–5.3)
POTASSIUM SERPL-SCNC: 4.1 MMOL/L — SIGNIFICANT CHANGE UP (ref 3.5–5.3)
PROT SERPL-MCNC: 7.5 G/DL — SIGNIFICANT CHANGE UP (ref 6–8.3)
PROT UR-MCNC: NEGATIVE MG/DL — SIGNIFICANT CHANGE UP
RBC # BLD: 4.35 M/UL — SIGNIFICANT CHANGE UP (ref 3.8–5.2)
RBC # FLD: 14.1 % — SIGNIFICANT CHANGE UP (ref 10.3–14.5)
RBC CASTS # UR COMP ASSIST: 7 /HPF — HIGH (ref 0–4)
RH IG SCN BLD-IMP: POSITIVE — SIGNIFICANT CHANGE UP
SODIUM SERPL-SCNC: 139 MMOL/L — SIGNIFICANT CHANGE UP (ref 135–145)
SP GR SPEC: 1.01 — SIGNIFICANT CHANGE UP (ref 1–1.03)
SQUAMOUS # UR AUTO: 1 /HPF — SIGNIFICANT CHANGE UP (ref 0–5)
UROBILINOGEN FLD QL: 0.2 MG/DL — SIGNIFICANT CHANGE UP (ref 0.2–1)
WBC # BLD: 6.73 K/UL — SIGNIFICANT CHANGE UP (ref 3.8–10.5)
WBC # FLD AUTO: 6.73 K/UL — SIGNIFICANT CHANGE UP (ref 3.8–10.5)
WBC UR QL: 2 /HPF — SIGNIFICANT CHANGE UP (ref 0–5)

## 2024-05-29 PROCEDURE — 99285 EMERGENCY DEPT VISIT HI MDM: CPT

## 2024-05-29 PROCEDURE — 76770 US EXAM ABDO BACK WALL COMP: CPT | Mod: 26

## 2024-05-29 PROCEDURE — 76801 OB US < 14 WKS SINGLE FETUS: CPT | Mod: 26

## 2024-05-29 PROCEDURE — 99284 EMERGENCY DEPT VISIT MOD MDM: CPT

## 2024-05-29 RX ORDER — KETOROLAC TROMETHAMINE 30 MG/ML
15 SYRINGE (ML) INJECTION ONCE
Refills: 0 | Status: DISCONTINUED | OUTPATIENT
Start: 2024-05-29 | End: 2024-05-29

## 2024-05-29 RX ORDER — SODIUM CHLORIDE 9 MG/ML
1000 INJECTION INTRAMUSCULAR; INTRAVENOUS; SUBCUTANEOUS ONCE
Refills: 0 | Status: COMPLETED | OUTPATIENT
Start: 2024-05-29 | End: 2024-05-29

## 2024-05-29 RX ADMIN — SODIUM CHLORIDE 1000 MILLILITER(S): 9 INJECTION INTRAMUSCULAR; INTRAVENOUS; SUBCUTANEOUS at 08:00

## 2024-05-29 RX ADMIN — Medication 15 MILLIGRAM(S): at 07:59

## 2024-05-29 NOTE — PHYSICAL EXAM
[Normal Appearance] : normal appearance [Edema] : no peripheral edema [] : no respiratory distress [Normal Station and Gait] : the gait and station were normal for the patient's age [Skin Color & Pigmentation] : normal skin color and pigmentation [No Focal Deficits] : no focal deficits [Oriented To Time, Place, And Person] : oriented to person, place, and time [FreeTextEntry1] : Pt in severe pain [de-identified] : pt in severe pain. Unable to assess

## 2024-05-29 NOTE — PHYSICAL EXAM
[Normal Appearance] : normal appearance [Edema] : no peripheral edema [] : no respiratory distress [Normal Station and Gait] : the gait and station were normal for the patient's age [Skin Color & Pigmentation] : normal skin color and pigmentation [No Focal Deficits] : no focal deficits [Oriented To Time, Place, And Person] : oriented to person, place, and time [FreeTextEntry1] : Pt in severe pain [de-identified] : pt in severe pain. Unable to assess

## 2024-05-29 NOTE — CONSULT NOTE ADULT - SUBJECTIVE AND OBJECTIVE BOX
HPI  37 y/o  LMP  (5w0d) presenting with right flank pain and urinary retention. Patient with multiple recent emergency room visits for RLQ pain, flank pain and urinary retention. Incidentally found to have pregnancy of unknown location  with appropriate increases in bHCG since. Patient reports that  was the last time she was able to urinate normally. She then re-presented  and had michael catheter placed, was seen by Urology outpatient on  in which michael was removed and patient was sent home with supplies to straight cath herself. Patient reports waking up last night in severe lower abdominal pain as well as right flank pain. She states she used the straight catheter but still felt as though she needed to use the bathroom. She also states she has had one bowel movement in the past 5 days which is not normal for her. She has not yet established care with a GYN but has an appointment with Dr. Mcdermott on . This is a highly desired pregnancy. She denies vaginal bleeding, nausea, vomiting, fevers, chills, SOB.     Urology consulted for right flank pain and urinary retention.    Patient is AVSS, WBC 6.7, Hct 35.3, Cr 0.9, UA shows trace LE, RBUS shows mild R hydronephrosis, pelvic US shows [id number] posterior uterine fibroid as well as an intrauterine gestation ~5wk 4d in age.      PAST MEDICAL & SURGICAL HISTORY:  No pertinent past medical history          MEDICATIONS  (STANDING):    MEDICATIONS  (PRN):      FAMILY HISTORY:      Allergies    No Known Allergies    Intolerances        SOCIAL HISTORY:    REVIEW OF SYSTEMS: Otherwise negative as stated in HPI    Physical Exam  Vital signs  T(C): 37.1 (24 @ 10:34), Max: 37.1 (24 @ 07:32)  HR: 87 (24 @ 10:34)  BP: 114/71 (24 @ 10:34)  SpO2: 100% (24 @ 10:34)  Wt(kg): --    Output    OUT:    Voided (mL): 550 mL  Total OUT: 550 mL    Total NET: -550 mL          Gen: NAD  Pulm: No respiratory distress	  CV: RRR  GI: S/ND/NT  :   MSK: moves all 4 limbs spontaneously    LABS:      05-29 @ 08:23    WBC 6.73  / Hct 35.3  / SCr 0.90         139  |  104  |  10  ----------------------------<  104<H>  4.1   |  24  |  0.90    Ca    9.3      29 May 2024 08:23    TPro  7.5  /  Alb  4.4  /  TBili  0.3  /  DBili  x   /  AST  10  /  ALT  10  /  AlkPhos  66        Urinalysis Basic - ( 29 May 2024 08:32 )    Color: Yellow / Appearance: Clear / S.014 / pH: x  Gluc: x / Ketone: Negative mg/dL  / Bili: Negative / Urobili: 0.2 mg/dL   Blood: x / Protein: Negative mg/dL / Nitrite: Negative   Leuk Esterase: Trace / RBC: 7 /HPF / WBC 2 /HPF   Sq Epi: x / Non Sq Epi: 1 /HPF / Bacteria: Negative /HPF            Urine Cx:    Blood Cx:    RADIOLOGY:     HPI  37 y/o  LMP  (5w0d) presenting with right flank pain and urinary retention. Patient with multiple recent emergency room visits for RLQ pain, flank pain and urinary retention. Incidentally found to have pregnancy of unknown location  with appropriate increases in bHCG since. Patient reports that  was the last time she was able to urinate normally. She then re-presented  and had michael catheter placed, was seen by Urology outpatient on  in which michael was removed and patient was sent home with supplies to straight cath herself. Patient reports waking up last night in severe lower abdominal pain as well as right flank pain. She states she used the straight catheter but still felt as though she needed to use the bathroom. She also states she has had one bowel movement in the past 5 days which is not normal for her. She has not yet established care with a GYN but has an appointment with Dr. Mcdermott on . This is a highly desired pregnancy. She denies vaginal bleeding, nausea, vomiting, fevers, chills, SOB.     Urology consulted for right flank pain and urinary retention.    Patient is AVSS, WBC 6.7, Hct 35.3, Cr 0.9, UA shows trace LE, RBUS shows mild R hydronephrosis, pelvic US shows [id number] posterior uterine fibroid as well as an intrauterine gestation ~5wk 4d in age.      PAST MEDICAL & SURGICAL HISTORY:  No pertinent past medical history          MEDICATIONS  (STANDING):    MEDICATIONS  (PRN):      FAMILY HISTORY:      Allergies    No Known Allergies    Intolerances        SOCIAL HISTORY:    REVIEW OF SYSTEMS: Otherwise negative as stated in HPI    Physical Exam  Vital signs  T(C): 37.1 (24 @ 10:34), Max: 37.1 (24 @ 07:32)  HR: 87 (24 @ 10:34)  BP: 114/71 (24 @ 10:34)  SpO2: 100% (24 @ 10:34)  Wt(kg): --    Output    OUT:    Voided (mL): 550 mL  Total OUT: 550 mL    Total NET: -550 mL          Gen: NAD  Pulm: No respiratory distress	  CV: RRR  GI: S/ND/NT  : michael secured draining clear yellow urine, denies CVA tenderness  MSK: moves all 4 limbs spontaneously    LABS:       @ 08:23    WBC 6.73  / Hct 35.3  / SCr 0.90         139  |  104  |  10  ----------------------------<  104<H>  4.1   |  24  |  0.90    Ca    9.3      29 May 2024 08:23    TPro  7.5  /  Alb  4.4  /  TBili  0.3  /  DBili  x   /  AST  10  /  ALT  10  /  AlkPhos  66        Urinalysis Basic - ( 29 May 2024 08:32 )    Color: Yellow / Appearance: Clear / S.014 / pH: x  Gluc: x / Ketone: Negative mg/dL  / Bili: Negative / Urobili: 0.2 mg/dL   Blood: x / Protein: Negative mg/dL / Nitrite: Negative   Leuk Esterase: Trace / RBC: 7 /HPF / WBC 2 /HPF   Sq Epi: x / Non Sq Epi: 1 /HPF / Bacteria: Negative /HPF            Urine Cx:    Blood Cx:    RADIOLOGY:     HPI  39 y/o  LMP  (5w0d) presenting with right flank pain and urinary retention. Patient with multiple recent emergency room visits for RLQ pain, flank pain and urinary retention. Incidentally found to have pregnancy of unknown location  with appropriate increases in bHCG since. Patient reports that  was the last time she was able to urinate normally. She then re-presented  and had michael catheter placed, was seen by Urology outpatient on  in which michael was removed and patient was sent home with supplies to straight cath herself. Patient reports waking up last night in severe lower abdominal pain as well as right flank pain. She states she used the straight catheter but still felt as though she needed to use the bathroom. She also states she has had one bowel movement in the past 5 days which is not normal for her. She has not yet established care with a GYN but has an appointment with Dr. Mcdermott on . This is a highly desired pregnancy. She denies vaginal bleeding, nausea, vomiting, fevers, chills, SOB.     Urology consulted for right flank pain and urinary retention.    Patient is AVSS, WBC 6.7, Hct 35.3, Cr 0.9, UA shows trace LE, RBUS shows mild R hydronephrosis, pelvic US shows [id number] posterior uterine fibroid as well as an intrauterine gestation ~5wk 4d in age.  Patient assessed at bedside in no acute distress, notes that had michael placed in past and went to [facility] for TOV where she voided some, but did not feel she emptied bladder completely. The NP       PAST MEDICAL & SURGICAL HISTORY:  No pertinent past medical history          MEDICATIONS  (STANDING):    MEDICATIONS  (PRN):      FAMILY HISTORY:      Allergies    No Known Allergies    Intolerances        SOCIAL HISTORY:    REVIEW OF SYSTEMS: Otherwise negative as stated in HPI    Physical Exam  Vital signs  T(C): 37.1 (24 @ 10:34), Max: 37.1 (24 @ 07:32)  HR: 87 (24 @ 10:34)  BP: 114/71 (24 @ 10:34)  SpO2: 100% (24 @ 10:34)  Wt(kg): --    Output    OUT:    Voided (mL): 550 mL  Total OUT: 550 mL    Total NET: -550 mL          Gen: NAD  Pulm: No respiratory distress	  CV: RRR  GI: S/ND/NT  : michael secured draining clear yellow urine, denies CVA tenderness  MSK: moves all 4 limbs spontaneously    LABS:       @ 08:23    WBC 6.73  / Hct 35.3  / SCr 0.90         139  |  104  |  10  ----------------------------<  104<H>  4.1   |  24  |  0.90    Ca    9.3      29 May 2024 08:23    TPro  7.5  /  Alb  4.4  /  TBili  0.3  /  DBili  x   /  AST  10  /  ALT  10  /  AlkPhos  66        Urinalysis Basic - ( 29 May 2024 08:32 )    Color: Yellow / Appearance: Clear / S.014 / pH: x  Gluc: x / Ketone: Negative mg/dL  / Bili: Negative / Urobili: 0.2 mg/dL   Blood: x / Protein: Negative mg/dL / Nitrite: Negative   Leuk Esterase: Trace / RBC: 7 /HPF / WBC 2 /HPF   Sq Epi: x / Non Sq Epi: 1 /HPF / Bacteria: Negative /HPF            Urine Cx:    Blood Cx:    RADIOLOGY:     HPI  39 y/o  LMP  (5w0d) presenting with right flank pain and urinary retention. Patient with multiple recent emergency room visits for RLQ pain, flank pain and urinary retention. Incidentally found to have pregnancy of unknown location  with appropriate increases in bHCG since. Patient reports that  was the last time she was able to urinate normally. She then re-presented  and had michael catheter placed, was seen by Urology outpatient on  in which michael was removed and patient was sent home with supplies to straight cath herself. Patient reports waking up last night in severe lower abdominal pain as well as right flank pain. She states she used the straight catheter but still felt as though she needed to use the bathroom. She also states she has had one bowel movement in the past 5 days which is not normal for her. She has not yet established care with a GYN but has an appointment with Dr. Mcdermott on . This is a highly desired pregnancy. She denies vaginal bleeding, nausea, vomiting, fevers, chills, SOB.     Urology consulted for right flank pain and urinary retention.    Patient is AVSS, WBC 6.7, Hct 35.3, Cr 0.9, UA shows trace LE, RBUS shows mild R hydronephrosis, pelvic US shows [id number] posterior uterine fibroid as well as an intrauterine gestation ~5wk 4d in age.  Patient assessed at bedside in no acute distress, R flank pain started , notes that had michael placed  and went to [facility] yesterday for TOV where she voided some, but did not feel she emptied bladder completely. The NP sent her home with CIC supplies and she went home, but began having R flank pain again at 1am, after which she performed CIC and emptied herself and her pain resolved. Her pain came back today so she decided to come back in.      PAST MEDICAL & SURGICAL HISTORY:  No pertinent past medical history          MEDICATIONS  (STANDING):    MEDICATIONS  (PRN):      FAMILY HISTORY:      Allergies    No Known Allergies    Intolerances        SOCIAL HISTORY:    REVIEW OF SYSTEMS: Otherwise negative as stated in HPI    Physical Exam  Vital signs  T(C): 37.1 (24 @ 10:34), Max: 37.1 (24 @ 07:32)  HR: 87 (24 @ 10:34)  BP: 114/71 (24 @ 10:34)  SpO2: 100% (24 @ 10:34)  Wt(kg): --    Output    OUT:    Voided (mL): 550 mL  Total OUT: 550 mL    Total NET: -550 mL          Gen: NAD  Pulm: No respiratory distress	  CV: RRR  GI: S/ND/NT  : michael secured draining clear yellow urine, denies CVA tenderness  MSK: moves all 4 limbs spontaneously    LABS:       @ 08:23    WBC 6.73  / Hct 35.3  / SCr 0.90         139  |  104  |  10  ----------------------------<  104<H>  4.1   |  24  |  0.90    Ca    9.3      29 May 2024 08:23    TPro  7.5  /  Alb  4.4  /  TBili  0.3  /  DBili  x   /  AST  10  /  ALT  10  /  AlkPhos  66        Urinalysis Basic - ( 29 May 2024 08:32 )    Color: Yellow / Appearance: Clear / S.014 / pH: x  Gluc: x / Ketone: Negative mg/dL  / Bili: Negative / Urobili: 0.2 mg/dL   Blood: x / Protein: Negative mg/dL / Nitrite: Negative   Leuk Esterase: Trace / RBC: 7 /HPF / WBC 2 /HPF   Sq Epi: x / Non Sq Epi: 1 /HPF / Bacteria: Negative /HPF            Urine Cx:    Blood Cx:    RADIOLOGY:  ACC: [id number] EXAM: US OB LES THAN 14 WKS 1ST GEST ORDERED BY: BROWN ROBLES    PROCEDURE DATE: 2024        INTERPRETATION: CLINICAL INFORMATION: Pregnancy of unknown origin.    LMP: 2024    Estimated Gestational Age by LMP: 2024    COMPARISON: None available.    Transabdominal pelvic sonogram only.    FINDINGS:  Uterus: Enlarged, myomatous uterus. Largest fibroid is posterior broad-based subserosal measuring 10.1 x 8.2 x 9.3 cm. Possible endometrial polyp measures 2.8 x 1.0 x 2.3 cm. Small gestational sac is present within the endometrial cavity demonstrating a mean diameter of 9.6 mm. No yolk sac or fetal pole is identified.    Gestational Sac Size (mean): 9.6 mm  Gestational Sac Shape : Normal.  Crown Rump Length: Not visualized  Estimated Gestational Age: 5 weeks 4 days  Yolk Sac: Not visualized  Fetal Heart Rate: N/A    Right ovary: 4.5 cm x 1.8 cm x 3.5 cm. Within normal limits.  Left ovary: 2.9 cm x 2.0 cm x 2.7 cm. Within normal limits.    Fluid: None.    IMPRESSION:  Early intrauterine gestation at 5 weeks 4 days based on today's mean sac diameter. No yolk sac or fetal pole is seen, likely due to the early stage of gestation.        --- End of Report ---    ACC: [id number] EXAM: US KIDNEYS AND BLADDER ORDERED BY: BROWN ROBLES    PROCEDURE DATE: 2024        INTERPRETATION: CLINICAL INFORMATION: Urinary retention. Right flank pain.    COMPARISON: None available.    TECHNIQUE: Sonography of the kidneys and bladder.    FINDINGS:  Right kidney: 11.2 cm. Mild hydronephrosis. No mass or calculus.    Left kidney: 10.6 cm. No renal mass, hydronephrosis or calculi.    Urinary bladder: Within Decompressed with a Michael catheter.    IMPRESSION:  Mild right hydronephrosis.        --- End of Report ---

## 2024-05-29 NOTE — ED ADULT NURSE REASSESSMENT NOTE - NS ED NURSE REASSESS COMMENT FT1
Report received from night shift RN, pt in NAD, 14Fr michael catheter placed, urine obtained and sent to lab. Will continue to monitor.

## 2024-05-29 NOTE — ED PROVIDER NOTE - PATIENT PORTAL LINK FT
You can access the FollowMyHealth Patient Portal offered by [facility] by registering at the following website: [email or web address]. By joining [employer]’s FollowMyHealth portal, you will also be able to view your health information using other applications (apps) compatible with our system.

## 2024-05-29 NOTE — ASSESSMENT
[FreeTextEntry1] : - Cordero catheter removed without complication. Due to pt severe lower abdominal pain, bladder was not filled and PVR not performed.  - Recommend staying hydration. Monitor urinary output. If unable to void 4-6 hours, notify the office/ proceed to the ER or urgent care.  - CIC taught and supplies given to pt should she experience urinary retention.  - Instructed pt to proceed to [facility] for further evaluation given her presenting symptoms. Pt states she will continue to monitor her symptoms and if needed she will go the ER.

## 2024-05-29 NOTE — ED PROVIDER NOTE - CLINICAL SUMMARY MEDICAL DECISION MAKING FREE TEXT BOX
37 yo F with no significant prior PMH, presenting to ED for evaluation of urinary retention and right sided flank pain. Patient was recently seen in our ED on 5/26/2024 for urinary retention with right lower quadrant abdominal pain, positive hCG, had a transvaginal ultrasound to rule out ectopic and was found to have pregnancy of unknown location. She was d/c with a michael secondary to possibly bladder obstruction in setting of 9cm posterior fibroid. Renal U/S demonstrating mild R hydronephrosis on 5/26/2024. Reports she has not urinated independently since prior to last hospital visit. Endorses dysuria and burning with urination while michael was in place. Patient was seen in outpatient office yesterday where michael was removed and she was sent home with self cath kit. Self cathed around 1am last night and had >400cc from bladder. Lower abdominal pain and right flank pain increasing.   Patient denies fevers, chills, HA, CP, SOB, n/v/d/c, numbness or weakness in the extremities. Denies recent new medications or changes in daily habits.  Denies recent travel or sick contacts.    On exam, appears in pain, speaking in clear and coherent sentences, A&Ox3. Lungs clear and equal breath sounds bilaterally, heart RRR, abdomen distended and tender suprapubic to palpation with + right sided CVA tenderness.   Vitals: /82, HR 89, afebrile at 98.8F, O2 100% on RA.  With vital signs within normal limits and stable.  Current pregnancy of unknown location, now with urinary retention and increasing abdominal distension and right sided flank pain.  Prior imaging completed showing urinary retention likely secondary to fibroid obstruction.    Will order transvaginal US and bHCG secondary to pregnancy of unknown location concern for ectopic pregnancy versus early pregnancy. Will check labs: CBC + diff, cmp, lipase, t&s/rh, IV insert and fluids, bedside ultrasound showing bladder retention, will place Michael, check urine, and reassess. 39 yo F with no significant prior PMH, presenting to ED for evaluation of urinary retention and right sided flank pain. Patient was recently seen in our ED on 5/26/2024 for urinary retention with right lower quadrant abdominal pain, positive hCG, had a transvaginal ultrasound to rule out ectopic and was found to have pregnancy of unknown location. She was d/c with a michael secondary to possibly bladder obstruction in setting of 9cm posterior fibroid. Renal U/S demonstrating mild R hydronephrosis on 5/26/2024. Reports she has not urinated independently since prior to last hospital visit. Endorses dysuria and burning with urination while michael was in place. Patient was seen in outpatient office yesterday where michael was removed and she was sent home with self cath kit. Self cathed around 1am last night and had >400cc from bladder. Lower abdominal pain and right flank pain increasing.   Patient denies fevers, chills, HA, CP, SOB, n/v/d/c, numbness or weakness in the extremities. Denies recent new medications or changes in daily habits.  Denies recent travel or sick contacts.    On exam, appears in pain, speaking in clear and coherent sentences, A&Ox3. Lungs clear and equal breath sounds bilaterally, heart RRR, abdomen distended and tender suprapubic to palpation with + right sided CVA tenderness.   Vitals: /82, HR 89, afebrile at 98.8F, O2 100% on RA.  With vital signs within normal limits and stable.  Current pregnancy of unknown location, now with urinary retention and increasing abdominal distension and right sided flank pain.  Prior imaging completed showing urinary retention likely secondary to fibroid obstruction.    Will order transvaginal US and bHCG secondary to pregnancy of unknown location concern for ectopic pregnancy versus early pregnancy. Will check labs: CBC + diff, cmp, lipase, t&s/rh, IV insert and fluids, bedside ultrasound showing bladder retention, will place Michael, check urine, and reassess.    See progress noted for ongoing care.

## 2024-05-29 NOTE — ED PROVIDER NOTE - PROGRESS NOTE DETAILS
MORGAN Brower: Patient seen bedside, appears comfortable. Pain well controlled. Reviewed labs with patient; within normal limits. HCG increasing. US kidneys and bladder again reveals mild right hydronephrosis. Transvag US reveals: Uterus: Enlarged, myomatous uterus. Largest fibroid is posterior broad-based subserosal measuring 10.1 x 8.2 x 9.3 cm. Possible endometrial polyp measures 2.8 x 1.0 x 2.3 cm. Small gestational sac is present within the endometrial cavity demonstrating a mean diameter of 9.6 mm. No yolk sac or fetal pole is identified.    Will page OBGYN; paged @ [phone]. Pregnancy of unknown origin and increasing size of uterine fibroid, possibly causing uterine retention. Patient has not established OBGYN provider as outpatient yet.   Will consider page to urology if appropriate pending obgyn rec secondary to retention. CANDELARIO Brower: Spoke with obgyn; will consult patient in ED MORGAN Brower: Patient seen by gyn, recommendations appreciated. Will call urology to consult patient in ED and provide input regarding urinary retention. Will further collaborate with gyn after urology consult.   Urology paged @ [phone] MORGAN Brower: Patient seen by gyn, recommendations appreciated. Will call urology to consult patient in ED and provide input regarding urinary retention. Will further collaborate with gyn after urology consult.   Urology paged @ [phone]  Urology to see patient and provide recommendations MORGAN Brower: Paged urology regarding recommendations to further communicate care plan with OBGYN @[phone] MORGAN Brower: Patient seen by uro, recommended continuing with michael vs straight cath in regards to retention. Patient seen by obgyn - they have arranged appointment with uro-gyn for tomorrow at 10am for consideration of management options for large fibroid in relation to retention.   Will consider d/c patient to home with outpatient FUV with uro-gyn and obgyn.

## 2024-05-29 NOTE — ASSESSMENT
[FreeTextEntry1] : - Cordero catheter removed without complication. Due to pt severe lower abdominal pain, bladder was not filled and PVR not performed.  - Recommend staying hydration. Monitor urinary output. If unable to void 4-6 hours, notify the office/ proceed to the ER or urgent care.  - CIC taught and supplies given to pt should she experience urinary retention.  - Instructed pt to proceed to [facility] for further evaluation given her presenting symptoms. Pt states she will continue to monitor her symptoms and if needed she will go the ER.

## 2024-05-29 NOTE — HISTORY OF PRESENT ILLNESS
[FreeTextEntry1] : 38-year patient, new to the office, presents today for michael removal and TOV s/p ER visit for urinary retention on 05/26/2024.  Pt currently reports 10/10 lower abdominal pain, rectal pressure and back pain. Tried Tylenol without improvement. Per pt she is 4 weeks pregnant. Denies hematuria or vaginal bleeding. Instructed pt to proceed to the ER to be evaluated.  Existing michael catheter attached to beg bag with clear, yellow urine and few small clots. Denies flank pain, fever, chills, nausea or vomit. Michael removed without complication.

## 2024-05-29 NOTE — ED PROVIDER NOTE - PHYSICAL EXAMINATION
PHYSICAL EXAM:   CONSTITUTIONAL: Appears in pain, awake, alert, oriented to person, place, time/situation.  HEAD: Atraumatic, normocephalic  NECK: Supple, no tender lymphadenopathy.  EYES: Clear bilaterally, pupils equal, round and reactive to light.  ENMT: Airway patent, Nasal mucosa clear. Mouth with normal mucosa.   CARDIAC: Regular rate, regular rhythm. No JVD. No calf tenderness.   RESPIRATORY: Breathing unlabored. Breath sounds clear and equal bilaterally.  ABDOMEN: Firm, distended, suprapubic tenderness. No rebound tenderness or guarding.  FLANK: + right sided CVA tenderness, no left sided CVS tenderness.   NEUROLOGICAL: Alert and oriented, no focal deficits, no motor or sensory deficits.  MSK: No clubbing, cyanosis, or edema.   SKIN: Skin warm and dry.

## 2024-05-29 NOTE — ASSESSMENT
[FreeTextEntry1] : - Cordero catheter removed without complication. Due to pt severe lower abdominal pain, bladder was not filled and PVR not performed.  - Recommend staying hydration. Monitor urinary output. If unable to void 4-6 hours, notify the office/ proceed to the ER or urgent care.  - CIC taught and supplies given to pt should she experience urinary retention.  - Instructed pt to proceed to [facility] for further evaluation given her presenting symptoms. Pt states she will continue to monitor her symptoms and if needed she will go the ER.

## 2024-05-29 NOTE — ED PROVIDER NOTE - ATTENDING APP SHARED VISIT CONTRIBUTION OF CARE
39 yo F with no significant prior PMH, presenting to ED for evaluation of urinary retention and right sided flank pain. Patient was recently seen in our ED on 5/26/2024 for urinary retention with right lower quadrant abdominal pain, positive hCG, had a transvaginal ultrasound to rule out ectopic and was found to have pregnancy of unknown location. She was d/c with a michael secondary to possibly bladder obstruction in setting of 9cm posterior fibroid. Renal U/S demonstrating mild R hydronephrosis on 5/26/2024. Reports she has not urinated independently since prior to last hospital visit. Endorses dysuria and burning with urination while michael was in place. Patient was seen in outpatient office yesterday where michael was removed and she was sent home with self cath kit. Self cathed around 1am last night and had >400cc from bladder. Lower abdominal pain and right flank pain increasing.

## 2024-05-29 NOTE — ED ADULT NURSE NOTE - CAS EDN DISCHARGE ASSESSMENT
Anesthesia Post-op Note    Patient: Rafi Montez  Procedure(s) Performed: COLONOSCOPY  Anesthesia type: MAC    Vitals Value Taken Time   Temp 36.6 °C (97.8 °F) 11/10/23 1250   Pulse 69 11/10/23 1250   Resp 18 11/10/23 1250   SpO2 98 % 11/10/23 1250   /88 11/10/23 1250         Patient Location: PACU Phase 1  Post-op Vital Signs:stable  Level of Consciousness: awake and alert  Respiratory Status: spontaneous ventilation  Cardiovascular stable  Hydration: euvolemic  Pain Management: adequately controlled  Handoff: Handoff to receiving clinician was performed and questions were answered  Vomiting: none  Nausea: None  Airway Patency:patent  Post-op Assessment: no complications and patient tolerated procedure well      No notable events documented.  
Alert and oriented to person, place and time

## 2024-05-29 NOTE — PHYSICAL EXAM
[Normal Appearance] : normal appearance [Edema] : no peripheral edema [] : no respiratory distress [Normal Station and Gait] : the gait and station were normal for the patient's age [Skin Color & Pigmentation] : normal skin color and pigmentation [No Focal Deficits] : no focal deficits [Oriented To Time, Place, And Person] : oriented to person, place, and time [FreeTextEntry1] : Pt in severe pain [de-identified] : pt in severe pain. Unable to assess

## 2024-05-29 NOTE — ED PROVIDER NOTE - OBJECTIVE STATEMENT
37 yo F with no significant prior PMH, presenting to ED for evaluation of urinary retention and right sided flank pain. Patient was recently seen in our ED on 5/26/2024 for urinary retention with right lower quadrant abdominal pain, positive hCG, had a transvaginal ultrasound to rule out ectopic and was found to have pregnancy of unknown location. She was d/c with a michael secondary to possibly bladder obstruction in setting of 9cm posterior fibroid. Renal U/S demonstrating mild R hydronephrosis on 5/26/2024. Reports she has not urinated independently since prior to last hospital visit. Endorses dysuria and burning with urination while michael was in place. Patient was seen in outpatient office yesterday where michael was removed and she was sent home with self cath kit. Self cathed around 1am last night and had >400cc from bladder. Lower abdominal pain and right flank pain increasing.   Patient denies fevers, chills, HA, CP, SOB, n/v/d/c, numbness or weakness in the extremities. Denies recent new medications or changes in daily habits.  Denies recent travel or sick contacts.

## 2024-05-29 NOTE — ED PROVIDER NOTE - CARE PLAN
1 Principal Discharge DX:	Urinary retention  Secondary Diagnosis:	Uterine fibroid  Secondary Diagnosis:	Pregnant

## 2024-05-29 NOTE — CONSULT NOTE ADULT - SUBJECTIVE AND OBJECTIVE BOX
AARON FIELD  38y  Female [id number]    HPI:        Name of GYN Physician:   OBHx:    GynHx: Denies fibroids, cysts, endometriosis, STI's, Abnormal pap smears   PMH:  PSH:  Meds:  Allx:  Social History:  Denies smoking use, drug use, alcohol use.   +occasional social alcohol use    Vital Signs Last 24 Hrs  T(C): 37.1 (29 May 2024 10:34), Max: 37.1 (29 May 2024 07:32)  T(F): 98.7 (29 May 2024 10:34), Max: 98.8 (29 May 2024 07:32)  HR: 87 (29 May 2024 10:34) (87 - 89)  BP: 114/71 (29 May 2024 10:34) (114/71 - 127/82)  BP(mean): --  RR: 16 (29 May 2024 10:34) (16 - 16)  SpO2: 100% (29 May 2024 10:34) (100% - 100%)    Parameters below as of 29 May 2024 10:34  Patient On (Oxygen Delivery Method): room air        Physical Exam:   General: sitting comfortably in bed, NAD   HEENT: neck supple, full ROM  CV: RRR  Lungs: CTA b/l, good air flow b/l   Back: No CVA tenderness  Abd: Soft, non-tender, non-distended.  Bowel sounds present.    :  No bleeding on pad.    External labia wnl.  Bimanual exam with no cervical motion tenderness, uterus wnl, adnexa non palpable b/l.  Cervix closed vs. Cervix dilated  Speculum Exam: No active bleeding from os.  Physiologic discharge.    Ext: non-tender b/l, no edema     LABS:                              11.9   6.73  )-----------( 252      ( 29 May 2024 08:23 )             35.3         139  |  104  |  10  ----------------------------<  104<H>  4.1   |  24  |  0.90    Ca    9.3      29 May 2024 08:23    TPro  7.5  /  Alb  4.4  /  TBili  0.3  /  DBili  x   /  AST  10  /  ALT  10  /  AlkPhos  66  05-29    I&O's Detail      Urinalysis Basic - ( 29 May 2024 08:32 )    Color: Yellow / Appearance: Clear / S.014 / pH: x  Gluc: x / Ketone: Negative mg/dL  / Bili: Negative / Urobili: 0.2 mg/dL   Blood: x / Protein: Negative mg/dL / Nitrite: Negative   Leuk Esterase: Trace / RBC: 7 /HPF / WBC 2 /HPF   Sq Epi: x / Non Sq Epi: 1 /HPF / Bacteria: Negative /HPF        RADIOLOGY & ADDITIONAL STUDIES:     AARON FIELD  38y  Female [id number]    HPI: 39 y/o  LMP  (5w0d) presenting with right flank pain and urinary retention. Patient with multiple recent emergency room visits for RLQ pain, flank pain and urinary retention. Incidentally found to have pregnancy of unknown location  with appropriate increases in bHCG since. Patient reports that  was the last time she was able to urinate normally. She then re-presented  and had michael catheter placed, was seen by Urology outpatient on  in which michael was removed and patient was sent home with supplies to straight cath herself. Patient reports waking up last night in severe lower abdominal pain as well as right flank pain. She states she used the straight catheter but still felt as though she needed to use the bathroom. She also states she has had one bowel movement in the past 5 days which is not normal for her. She has not yet established care with a GYN but has an appointment with Dr. Mcdermott on . This is a highly desired pregnancy. She denies vaginal bleeding, nausea, vomiting, fevers, chills, SOB.         Name of GYN Physician: Denies  OBHx: x1 eTOP  GynHx: Denies fibroids, cysts, endometriosis, STI's, Abnormal pap smears   PMH: Denies  PSH: Denies  Meds: Denies  Allx: NKDA  Social History:  Denies smoking use, drug use, alcohol use.      Vital Signs Last 24 Hrs  T(C): 37.1 (29 May 2024 10:34), Max: 37.1 (29 May 2024 07:32)  T(F): 98.7 (29 May 2024 10:34), Max: 98.8 (29 May 2024 07:32)  HR: 87 (29 May 2024 10:34) (87 - 89)  BP: 114/71 (29 May 2024 10:34) (114/71 - 127/82)  BP(mean): --  RR: 16 (29 May 2024 10:34) (16 - 16)  SpO2: 100% (29 May 2024 10:34) (100% - 100%)    Parameters below as of 29 May 2024 10:34  Patient On (Oxygen Delivery Method): room air        Physical Exam:   General: sitting comfortably in bed, NAD   HEENT: neck supple, full ROM  CV: RRR  Lungs: CTA b/l, good air flow b/l   Back: + right CVA tenderness  Abd: Soft, non-tender. Large fibroid uterus.  : Patient declined  Speculum Exam: Patient declined      LABS:                        11.9   6.73  )-----------( 252      ( 29 May 2024 08:23 )             35.3         139  |  104  |  10  ----------------------------<  104<H>  4.1   |  24  |  0.90    Ca    9.3      29 May 2024 08:23    TPro  7.5  /  Alb  4.4  /  TBili  0.3  /  DBili  x   /  AST  10  /  ALT  10  /  AlkPhos  66      I&O's Detail      Urinalysis Basic - ( 29 May 2024 08:32 )    Color: Yellow / Appearance: Clear / S.014 / pH: x  Gluc: x / Ketone: Negative mg/dL  / Bili: Negative / Urobili: 0.2 mg/dL   Blood: x / Protein: Negative mg/dL / Nitrite: Negative   Leuk Esterase: Trace / RBC: 7 /HPF / WBC 2 /HPF   Sq Epi: x / Non Sq Epi: 1 /HPF / Bacteria: Negative /HPF        RADIOLOGY & ADDITIONAL STUDIES:    < from: US OB <=14 Weeks, First Gestation (24 @ 09:54) >    ACC: [id number] EXAM:  US OB LES THAN 14 WKS 1ST GEST   ORDERED BY: BROWN ROBLES     PROCEDURE DATE:  2024          INTERPRETATION:  CLINICAL INFORMATION: Pregnancy of unknown origin.    LMP: 2024    Estimated Gestational Age by LMP: 2024    COMPARISON: None available.    Transabdominal pelvic sonogram only.    FINDINGS:  Uterus: Enlarged, myomatous uterus. Largest fibroid is posterior   broad-based subserosal measuring 10.1 x 8.2 x 9.3 cm. Possible   endometrial polyp measures2.8 x 1.0 x 2.3 cm. Small gestational sac is   present within the endometrial cavity demonstrating a mean diameter of   9.6 mm. No yolk sac or fetal pole is identified.    Gestational Sac Size (mean): 9.6 mm  Gestational Sac Shape : Normal.  Crown Rump Length: Not visualized  Estimated Gestational Age: 5 weeks 4 days  Yolk Sac: Not visualized  Fetal Heart Rate: N/A    Right ovary: 4.5 cm x 1.8 cm x 3.5 cm. Within normal limits.  Left ovary: 2.9 cm x 2.0 cm x 2.7 cm. Within normal limits.    Fluid: None.    IMPRESSION:  Early intrauterine gestation at 5 weeks 4 days based on today's mean sac   diameter. No yolk sac or fetal pole is seen, likely due to the early   stage of gestation.        --- End of Report ---            MELVI CORONEL MD; Attending Radiologist  This document has been electronically signed. May 29 2024 10:03AM    < end of copied text >  < from: US Kidney and Bladder (24 @ 09:54) >  ACC: [id number] EXAM:  US KIDNEYS AND BLADDER   ORDERED BY: BROWN ROBLES     PROCEDURE DATE:  2024          INTERPRETATION:  CLINICAL INFORMATION: Urinary retention. Right flank   pain.    COMPARISON: None available.    TECHNIQUE: Sonography ofthe kidneys and bladder.    FINDINGS:  Right kidney: 11.2 cm. Mild hydronephrosis. No mass or calculus.    Left kidney: 10.6 cm. No renal mass, hydronephrosis or calculi.    Urinary bladder: Within Decompressed with a Michael catheter.    IMPRESSION:  Mild right hydronephrosis.        --- End of Report ---            MELVI CORONEL MD; Attending Radiologist  This document has been electronically signed. May 29 2024  9:57AM    < end of copied text >   AARON FIELD  38y  Female [id number]    HPI: 39 y/o  LMP  (5w0d) presenting with right flank pain and urinary retention. Patient with multiple recent emergency room visits for RLQ pain, flank pain and urinary retention. Incidentally found to have pregnancy of unknown location  with appropriate increases in bHCG since. Patient reports that  was the last time she was able to urinate normally. She then re-presented  and had michael catheter placed, was seen by Urology outpatient on  in which michael was removed and patient was sent home with supplies to straight cath herself. Patient reports waking up last night in severe lower abdominal pain as well as right flank pain. She states she used the straight catheter but still felt as though she needed to use the bathroom. She also states she has had one bowel movement in the past 5 days which is not normal for her. She has not yet established care with a GYN but has an appointment with Dr. Mcdermott on . This is a highly desired pregnancy. She denies vaginal bleeding, nausea, vomiting, fevers, chills, SOB.         Name of GYN Physician: Denies  OBHx: x1 eTOP  GynHx: Denies fibroids, cysts, endometriosis, STI's, Abnormal pap smears   PMH: Denies  PSH: Denies  Meds: Denies  Allx: NKDA  Social History:  Denies smoking use, drug use, alcohol use.      Vital Signs Last 24 Hrs  T(C): 37.1 (29 May 2024 10:34), Max: 37.1 (29 May 2024 07:32)  T(F): 98.7 (29 May 2024 10:34), Max: 98.8 (29 May 2024 07:32)  HR: 87 (29 May 2024 10:34) (87 - 89)  BP: 114/71 (29 May 2024 10:34) (114/71 - 127/82)  BP(mean): --  RR: 16 (29 May 2024 10:34) (16 - 16)  SpO2: 100% (29 May 2024 10:34) (100% - 100%)    Parameters below as of 29 May 2024 10:34  Patient On (Oxygen Delivery Method): room air        Physical Exam:   General: sitting comfortably in bed, NAD   HEENT: neck supple, full ROM  CV: RRR  Lungs: CTA b/l, good air flow b/l   Back: + right CVA tenderness  Abd: Soft, non-tender. Large fibroid uterus.  : Patient declined  Speculum Exam: Patient declined      LABS:                        11.9   6.73  )-----------( 252      ( 29 May 2024 08:23 )             35.3         139  |  104  |  10  ----------------------------<  104<H>  4.1   |  24  |  0.90    Ca    9.3      29 May 2024 08:23    TPro  7.5  /  Alb  4.4  /  TBili  0.3  /  DBili  x   /  AST  10  /  ALT  10  /  AlkPhos  66      I&O's Detail      Urinalysis Basic - ( 29 May 2024 08:32 )    Color: Yellow / Appearance: Clear / S.014 / pH: x  Gluc: x / Ketone: Negative mg/dL  / Bili: Negative / Urobili: 0.2 mg/dL   Blood: x / Protein: Negative mg/dL / Nitrite: Negative   Leuk Esterase: Trace / RBC: 7 /HPF / WBC 2 /HPF   Sq Epi: x / Non Sq Epi: 1 /HPF / Bacteria: Negative /HPF        RADIOLOGY & ADDITIONAL STUDIES:    < from: US OB <=14 Weeks, First Gestation (24 @ 09:54) >    ACC: [id number] EXAM:  US OB LES THAN 14 WKS 1ST GEST   ORDERED BY: BROWN ROBLES     PROCEDURE DATE:  2024          INTERPRETATION:  CLINICAL INFORMATION: Pregnancy of unknown origin.    LMP: 2024    Estimated Gestational Age by LMP: 2024    COMPARISON: None available.    Transabdominal pelvic sonogram only.    FINDINGS:  Uterus: Enlarged, myomatous uterus. Largest fibroid is posterior   broad-based subserosal measuring 10.1 x 8.2 x 9.3 cm. Possible   endometrial polyp measures2.8 x 1.0 x 2.3 cm. Small gestational sac is   present within the endometrial cavity demonstrating a mean diameter of   9.6 mm. No yolk sac or fetal pole is identified.    Gestational Sac Size (mean): 9.6 mm  Gestational Sac Shape : Normal.  Crown Rump Length: Not visualized  Estimated Gestational Age: 5 weeks 4 days  Yolk Sac: Not visualized  Fetal Heart Rate: N/A    Right ovary: 4.5 cm x 1.8 cm x 3.5 cm. Within normal limits.  Left ovary: 2.9 cm x 2.0 cm x 2.7 cm. Within normal limits.    Fluid: None.    IMPRESSION:  Early intrauterine gestation at 5 weeks 4 days based on today's mean sac   diameter. No yolk sac or fetal pole is seen, likely due to the early   stage of gestation.      --- End of Report ---        MELVI CORONEL MD; Attending Radiologist  This document has been electronically signed. May 29 2024 10:03AM    < end of copied text >  < from: US Kidney and Bladder (24 @ 09:54) >  ACC: [id number] EXAM:  US KIDNEYS AND BLADDER   ORDERED BY: BROWN ROBLES     PROCEDURE DATE:  2024          INTERPRETATION:  CLINICAL INFORMATION: Urinary retention. Right flank   pain.    COMPARISON: None available.    TECHNIQUE: Sonography ofthe kidneys and bladder.    FINDINGS:  Right kidney: 11.2 cm. Mild hydronephrosis. No mass or calculus.    Left kidney: 10.6 cm. No renal mass, hydronephrosis or calculi.    Urinary bladder: Within Decompressed with a Michael catheter.    IMPRESSION:  Mild right hydronephrosis.        --- End of Report ---            MELVI CORONEL MD; Attending Radiologist  This document has been electronically signed. May 29 2024  9:57AM    < end of copied text >

## 2024-05-29 NOTE — ED PROVIDER NOTE - NSFOLLOWUPINSTRUCTIONS_ED_ALL_ED_FT
You were seen in the ED for evaluation of urinary retention.   Results of testing completed in the ED are included in this discharge packet.   It was found that you have no signs of medical emergency warranting further evaluation in the emergency department.   It is recommended you follow up with urology-gynecology tomorrow as discussed with the obgyn team.   It is also recommended you follow up with obgyn in regards to pregnancy status at recommended appointment in June.   Return to the ED if you experience worsening of symptoms, are unable to urinate, experience fevers or chills, eat or drink, have chest pain, shortness of breath, changes in vision, changes in speech, numbness or weakness in the extremities.    Acute Urinary Retention, Female  Acute urinary retention is a condition in which a person is unable to pass urine or can only pass a little urine. This condition can happen suddenly and last for a short time. If left untreated, it can become long-term (chronic) and result in kidney damage or other serious complications.    What are the causes?  This condition may be caused by:  Obstruction or narrowing of the tube that drains the bladder (urethra). This may be caused by surgery, problems with nearby organs, or injury to the bladder or urethra.  Problems with the nerves in the bladder.  Pelvic organ prolapse.  Tumors in the area of the pelvis, bladder, or urethra.  Vaginal childbirth.  Bladder or urinary tract infection.  Constipation.  Certain medicines.  What increases the risk?  This condition is more likely to develop in women over age 50. Other chronic health conditions can increase the risk of acute urinary retention. These include:  Diseases such as multiple sclerosis.  Spinal cord injuries.  Diabetes.  Degenerative cognitive conditions, such as delirium or dementia.  Psychological conditions. A woman may hold her urine due to trauma or because she does not want to use the bathroom.  History of preexisting urinary retention.  History of prior pelvic surgery, incontinence surgery, or radical pelvic surgery.  What are the signs or symptoms?  Symptoms of this condition include:  Trouble urinating.  Pain in the lower abdomen.  How is this diagnosed?  This condition is diagnosed based on a physical exam and your medical history. You may also have other tests, including:  An ultrasound of the bladder or kidneys or both.  Blood tests.  A urine analysis.  Additional tests may be needed, such as a CT scan, MRI, and kidney or bladder function tests.  How is this treated?  Treatment for this condition may include:  Medicines.  Placing a thin, sterile tube (catheter) into the bladder to drain urine out of the body. This is called an indwelling urinary catheter. After it is inserted, the catheter is held in place with a small balloon that is filled with sterile water. Urine drains from the catheter into a collection bag outside of the body.  Behavioral therapy.  Treatment for other conditions.  If needed, you may be treated in the hospital for kidney function problems or to manage other complications.    Follow these instructions at home:  Medicines    Take over-the-counter and prescription medicines only as told by your health care provider. Avoid certain medicines, such as decongestants, antihistamines, and some prescription medicines. Do not take any medicine unless your health care provider approves.  If you were prescribed an antibiotic medicine, take it as told by your health care provider. Do not stop using the antibiotic even if you start to feel better.  General instructions    Do not use any products that contain nicotine or tobacco. These products include cigarettes, chewing tobacco, and vaping devices, such as e-cigarettes. If you need help quitting, ask your health care provider.  Drink enough fluid to keep your urine pale yellow.  If you have an indwelling urinary catheter, follow the instructions from your health care provider.  Monitor any changes in your symptoms. Tell your health care provider about any changes.  If instructed, monitor your blood pressure at home. Report changes as told by your health care provider.  Keep all follow-up visits. This is important.  Contact a health care provider if:  You have uncomfortable bladder contractions that you cannot control (spasms).  You leak urine with the spasms.  Get help right away if:  You have chills or a fever.  You have blood in your urine.  You have a catheter and the following happens:  Your catheter stops draining urine.  Your catheter falls out.  Summary  Acute urinary retention is a condition in which a person is unable to pass urine or can only pass a little urine. If left untreated, this can result in kidney damage or other serious complications.  One cause of this condition may be obstruction or narrowing of the tube that drains the bladder (urethra). This may be caused by surgery, problems with nearby organs, or injury to the bladder or urethra.  Treatment may include medicines and placement of an indwelling urinary catheter.  Monitor any changes in your symptoms. Tell your health care provider about any changes.  This information is not intended to replace advice given to you by your health care provider. Make sure you discuss any questions you have with your health care provider. You were seen in the ED for evaluation of urinary retention.   Results of testing completed in the ED are included in this discharge packet.   It was found that you have no signs of medical emergency warranting further evaluation in the emergency department.   The michael will remain in place at discharge.   It is recommended you follow up with urology-gynecology tomorrow as discussed with the obgyn team.   It is also recommended you follow up with obgyn in regards to pregnancy status at recommended appointment in June.   Return to the ED if you experience worsening of symptoms, are unable to urinate, experience fevers or chills, eat or drink, have chest pain, shortness of breath, changes in vision, changes in speech, numbness or weakness in the extremities.    Acute Urinary Retention, Female  Acute urinary retention is a condition in which a person is unable to pass urine or can only pass a little urine. This condition can happen suddenly and last for a short time. If left untreated, it can become long-term (chronic) and result in kidney damage or other serious complications.    What are the causes?  This condition may be caused by:  Obstruction or narrowing of the tube that drains the bladder (urethra). This may be caused by surgery, problems with nearby organs, or injury to the bladder or urethra.  Problems with the nerves in the bladder.  Pelvic organ prolapse.  Tumors in the area of the pelvis, bladder, or urethra.  Vaginal childbirth.  Bladder or urinary tract infection.  Constipation.  Certain medicines.  What increases the risk?  This condition is more likely to develop in women over age 50. Other chronic health conditions can increase the risk of acute urinary retention. These include:  Diseases such as multiple sclerosis.  Spinal cord injuries.  Diabetes.  Degenerative cognitive conditions, such as delirium or dementia.  Psychological conditions. A woman may hold her urine due to trauma or because she does not want to use the bathroom.  History of preexisting urinary retention.  History of prior pelvic surgery, incontinence surgery, or radical pelvic surgery.  What are the signs or symptoms?  Symptoms of this condition include:  Trouble urinating.  Pain in the lower abdomen.  How is this diagnosed?  This condition is diagnosed based on a physical exam and your medical history. You may also have other tests, including:  An ultrasound of the bladder or kidneys or both.  Blood tests.  A urine analysis.  Additional tests may be needed, such as a CT scan, MRI, and kidney or bladder function tests.  How is this treated?  Treatment for this condition may include:  Medicines.  Placing a thin, sterile tube (catheter) into the bladder to drain urine out of the body. This is called an indwelling urinary catheter. After it is inserted, the catheter is held in place with a small balloon that is filled with sterile water. Urine drains from the catheter into a collection bag outside of the body.  Behavioral therapy.  Treatment for other conditions.  If needed, you may be treated in the hospital for kidney function problems or to manage other complications.    Follow these instructions at home:  Medicines    Take over-the-counter and prescription medicines only as told by your health care provider. Avoid certain medicines, such as decongestants, antihistamines, and some prescription medicines. Do not take any medicine unless your health care provider approves.  If you were prescribed an antibiotic medicine, take it as told by your health care provider. Do not stop using the antibiotic even if you start to feel better.  General instructions    Do not use any products that contain nicotine or tobacco. These products include cigarettes, chewing tobacco, and vaping devices, such as e-cigarettes. If you need help quitting, ask your health care provider.  Drink enough fluid to keep your urine pale yellow.  If you have an indwelling urinary catheter, follow the instructions from your health care provider.  Monitor any changes in your symptoms. Tell your health care provider about any changes.  If instructed, monitor your blood pressure at home. Report changes as told by your health care provider.  Keep all follow-up visits. This is important.  Contact a health care provider if:  You have uncomfortable bladder contractions that you cannot control (spasms).  You leak urine with the spasms.  Get help right away if:  You have chills or a fever.  You have blood in your urine.  You have a catheter and the following happens:  Your catheter stops draining urine.  Your catheter falls out.  Summary  Acute urinary retention is a condition in which a person is unable to pass urine or can only pass a little urine. If left untreated, this can result in kidney damage or other serious complications.  One cause of this condition may be obstruction or narrowing of the tube that drains the bladder (urethra). This may be caused by surgery, problems with nearby organs, or injury to the bladder or urethra.  Treatment may include medicines and placement of an indwelling urinary catheter.  Monitor any changes in your symptoms. Tell your health care provider about any changes.  This information is not intended to replace advice given to you by your health care provider. Make sure you discuss any questions you have with your health care provider.

## 2024-05-29 NOTE — CONSULT NOTE ADULT - ATTENDING COMMENTS
she was seen and above was reviewed, discussed plan in detail. Suggest discharge with Cordero to drainage. She has a follow up with urogynecology tomorrow. Discussed self catheterizations till further workup is done.

## 2024-05-29 NOTE — ED ADULT TRIAGE NOTE - CHIEF COMPLAINT QUOTE
Pt c/o sharp right sided flank pain and urinary retention, nausea since 1am. pt was recent seen here for similar. Pt had a urinary catheter that they removed yesterday morning. Pt straight cathed herself at approx 3am. No complaints of chest pain, headache, nausea, dizziness, vomiting  SOB, fever, chills verbalized.. Pt appears uncomfortable. Pt c/o sharp right sided flank pain and urinary retention, nausea since 1am. pt was recent seen here for similar. Pt had a urinary catheter that they removed yesterday morning. Pt straight cathed herself at approx 3am.  pt states shes approx 2 weeks pregnant. No complaints of chest pain, headache, nausea, dizziness, vomiting  SOB, fever, chills verbalized.. Pt appears uncomfortable.

## 2024-05-29 NOTE — ED ADULT NURSE NOTE - CHIEF COMPLAINT QUOTE
Pt c/o sharp right sided flank pain and urinary retention, nausea since 1am. pt was recent seen here for similar. Pt had a urinary catheter that they removed yesterday morning. Pt straight cathed herself at approx 3am.  pt states shes approx 2 weeks pregnant. No complaints of chest pain, headache, nausea, dizziness, vomiting  SOB, fever, chills verbalized.. Pt appears uncomfortable.

## 2024-05-29 NOTE — HISTORY OF PRESENT ILLNESS
Daughter asked if last office note can be mailed to her: [address], [place] [address]. Also, would like to set up phone meeting with me to discuss resources available to them. We will find a date and discuss social support needs and resources.    [FreeTextEntry1] : 38-year patient, new to the office, presents today for michael removal and TOV s/p ER visit for urinary retention on 05/26/2024.  Pt currently reports 10/10 lower abdominal pain, rectal pressure and back pain. Tried Tylenol without improvement. Per pt she is 4 weeks pregnant. Denies hematuria or vaginal bleeding. Instructed pt to proceed to the ER to be evaluated.  Existing michael catheter attached to beg bag with clear, yellow urine and few small clots. Denies flank pain, fever, chills, nausea or vomit. Michael removed without complication.

## 2024-05-29 NOTE — CONSULT NOTE ADULT - ASSESSMENT
39 y/o  LMP  (5w0d) presenting with right flank pain and urinary retention. Patient with multiple recent emergency room visits for RLQ pain, flank pain and urinary retention, was the last time she was able to urinate normally , had michael catheter placed , was seen by Urology outpatient on  in which michael was removed and patient was sent home with supplies to straight cath herself. Patient also has not had bowel movement in past 5d, which is abnormal for her. Urology consulted for R flank pain and urinary retention. Patient is AVSS, WBC 6.7, Hct 35.3, Cr 0.9, UA shows trace LE, RBUS shows mild R hydronephrosis, pelvic US shows [id number] posterior uterine fibroid as well as an intrauterine gestation ~5wk 4d in age.    Urinary retention could be caused by mass effect of posterior [id number] fibroid on bladder causing outlet obstruction, mild R hydro likely secondary to the obstruction.    Recommendations:  - Continue michael  - Definitive treatment of fibroids will likely resolve bladder/bowel symptoms    ***Note incomplete*** 39 y/o  LMP  (5w0d) presenting with right flank pain and urinary retention. Patient with multiple recent emergency room visits for RLQ pain, flank pain and urinary retention, was the last time she was able to urinate normally , had michael catheter placed , was seen by Urology outpatient on  in which michael was removed and patient was sent home with supplies to straight cath herself. Patient also has not had bowel movement in past 5d, which is abnormal for her. Urology consulted for R flank pain and urinary retention. Patient is AVSS, WBC 6.7, Hct 35.3, Cr 0.9, UA shows trace LE, RBUS shows mild R hydronephrosis, pelvic US shows [id number] posterior uterine fibroid as well as an intrauterine gestation ~5wk 4d in age.    Urinary retention could be caused by mass effect of posterior [id number] fibroid on bladder causing outlet obstruction, mild R hydro likely secondary to the obstruction. Patient will likely need to CIC for duration of pregnancy if unable to TOV, or monthly indwelling michael exchange if no contraindication to intrauterine pregnancy.    Recommendations:  - Continue michael in house  - Definitive treatment of fibroids will likely resolve bladder/bowel symptoms  - Home with michael  - F/u with urogynecology at 10am tomorrow  - F/u with urology outpatient for TOV or CIC [facility]  [address] [place] [id number] [phone]    Discussed with on call attending, Dr. Crow Frost

## 2024-05-29 NOTE — ED ADULT NURSE NOTE - OBJECTIVE STATEMENT
Pt arrives to ED A&Ox4, ambulatory at baseline. Pt C/O right sided flank pain x1 day. Pt states she got a michael removed yesterday, attempted to straight cath herself at 3AM and felt relief temporarily, then the pain came back. Pt bladder observed to be distended. Airway intact, respirations are even and unlabored, abdomen is soft and nondistended capillary refill is 2 seconds bilaterally. 20G IV placed in left AC, labs drawn and sent. Medicated as per EMAR. Bed in lowest position, comfort measures provided, safety maintained.

## 2024-05-29 NOTE — CONSULT NOTE ADULT - ASSESSMENT
37 y/o  LMP  (5w0d) presenting with right flank pain and urinary retention.     37 y/o  LMP  (5w0d) presenting with right flank pain and urinary retention. Patient currently with pregnancy of unknown location, [id number](), TAUS today showing intrauterine gestational sac with no yolk sac or fetal pole, no suspicious adnexal masses or free fluid. TAUS also showing posterior broad-based subserosal measuring 10.1 x 8.2 x 9.3 cm. KUB US with evidence of right mild hydronephrosis. Discussed at length with patient that urinary retention likely due to large fibroid but given patient with likely highly desired intrauterine pregnancy, myomectomy would not be recommended at this time. Patient currently with relief of abdominal pain with placement of michael catheter and agreeable to follow up outpatient for continued management of urinary retention with Urogynecology.     - Appointment made for Urogynecology appointment tomorrow @10am  - Agree w/ preliminary recs by urology to keep michael catheter in  - Will need to continue to trend bHCG and have another ultrasound to confirm intrauterine pregnancy within the next week    MORGAN Rivera  Seen w/ Dr. Kern

## 2024-05-30 ENCOUNTER — NON-APPOINTMENT (OUTPATIENT)
Age: 39
End: 2024-05-30

## 2024-05-30 ENCOUNTER — APPOINTMENT (OUTPATIENT)
Dept: UROGYNECOLOGY | Facility: CLINIC | Age: 39
End: 2024-05-30
Payer: COMMERCIAL

## 2024-05-30 VITALS
DIASTOLIC BLOOD PRESSURE: 72 MMHG | BODY MASS INDEX: 23.32 KG/M2 | HEIGHT: 65 IN | SYSTOLIC BLOOD PRESSURE: 116 MMHG | HEART RATE: 97 BPM | WEIGHT: 140 LBS

## 2024-05-30 PROCEDURE — 99204 OFFICE O/P NEW MOD 45 MIN: CPT | Mod: 25

## 2024-05-30 PROCEDURE — A4562: CPT

## 2024-05-30 PROCEDURE — 99459 PELVIC EXAMINATION: CPT

## 2024-05-30 PROCEDURE — 51725 SIMPLE CYSTOMETROGRAM: CPT

## 2024-05-30 RX ORDER — NITROFURANTOIN (MONOHYDRATE/MACROCRYSTALS) 25; 75 MG/1; MG/1
100 CAPSULE ORAL
Qty: 10 | Refills: 0 | Status: ACTIVE | COMMUNITY
Start: 2024-05-30 | End: 1900-01-01

## 2024-05-30 NOTE — DISCUSSION/SUMMARY
[FreeTextEntry1] :  -RS#4 pessary inserted -Simple CMG performed, patient able to void with pessary in place. Catheter left out -Patient given catheters to use prn retention -Will give Nitrofurantoin x 5 days due to multiple catheterizations over past few days and likely colonization -RTO in 1 week for follow up and pessary teaching

## 2024-05-30 NOTE — HISTORY OF PRESENT ILLNESS
[FreeTextEntry1] :  Melva presents as emergency consult from ER at [facility]. She has a h/o enlarged fibroid uterus, largest [id number] posterior myoma. She was seen in ER due to incomplete bladder emptying. She has been unable to void on own since Saturday. She was seen by urology yesterday and taught CIC however had severe bladder and abdominal pain and went to ER. In ER she underwent imaging which revealed gestational sac c/w IUP 5w4d, large myoma, mild right hydronephrosis. Indwelling cath inserted. She presents today for insertion of pessary to elevate uterus off bladder to see if obstruction relieved. She has appt with Dr Mcdermott end of June.

## 2024-05-30 NOTE — PHYSICAL EXAM
[Chaperone Present] : A chaperone was present in the examining room during all aspects of the physical examination [[id number]] : A chaperone was present during the pelvic exam. [Mass (___ Cm)] : a(n) [unfilled] cm abdominal mass was palpated [Labia Majora] : were normal [Normal Appearance] : general appearance was normal [Normal] : normal [Normal rectal exam] : was normal [FreeTextEntry1] : General: Well, appearing. Alert and orientated. HEENT: Normocephalic, atraumatic and extraocular muscles appear to be intact  Neck: Full range of motion, no obvious lymphadenopathy, deformities, or masses noted  Respiratory: Speaking in full sentences comfortably, normal work of breathing and no cough during visit Musculoskeletal: active full range of motion in extremities  Extremities: No upper extremity edema noted Skin: no obvious rash or skin lesions Neuro: Orientated X 3, speech is fluent, normal rate Psych: Normal mood and affect    [de-identified] : Enlarged fibroid uterus, postterior myoma compressing bladder [de-identified] : indwelling cath

## 2024-05-30 NOTE — REASON FOR VISIT
[Questionnaire Received] : Patient questionnaire received [Intake Form Reviewed] : Patient intake form with past medical history, surgical history, family history and social history reviewed today [Cannot Empty Bladder] : cannot empty bladder

## 2024-05-30 NOTE — PROCEDURE
[First Sensation: _____ ml] : first sensation at [unfilled] ml [Fullness: ____ ml] : fullness at [unfilled] ml [Normal] : with normal results [Good Fit] : fits well [Refit] : refit is not needed [Erosion] : no evidence of erosion [Erythema] : no erythema [Discharge] : no vaginal discharge [Infection] : no evidence of infection [Pessary Inserted] : inserted [FreeTextEntry1] : Cath removed and patient able to void 180cc, PVR 20cc

## 2024-05-31 ENCOUNTER — APPOINTMENT (OUTPATIENT)
Dept: UROGYNECOLOGY | Facility: CLINIC | Age: 39
End: 2024-05-31

## 2024-06-01 ENCOUNTER — NON-APPOINTMENT (OUTPATIENT)
Age: 39
End: 2024-06-01

## 2024-06-03 ENCOUNTER — EMERGENCY (EMERGENCY)
Facility: HOSPITAL | Age: 39
LOS: 1 days | Discharge: HOME | End: 2024-06-03
Attending: EMERGENCY MEDICINE | Admitting: EMERGENCY MEDICINE
Payer: COMMERCIAL

## 2024-06-03 ENCOUNTER — APPOINTMENT (OUTPATIENT)
Dept: UROGYNECOLOGY | Facility: CLINIC | Age: 39
End: 2024-06-03

## 2024-06-03 VITALS
DIASTOLIC BLOOD PRESSURE: 72 MMHG | SYSTOLIC BLOOD PRESSURE: 112 MMHG | TEMPERATURE: 98 F | HEART RATE: 88 BPM | HEIGHT: 65 IN | RESPIRATION RATE: 15 BRPM | OXYGEN SATURATION: 100 % | WEIGHT: 139.99 LBS

## 2024-06-03 VITALS
TEMPERATURE: 98 F | OXYGEN SATURATION: 99 % | RESPIRATION RATE: 18 BRPM | DIASTOLIC BLOOD PRESSURE: 70 MMHG | HEART RATE: 78 BPM | SYSTOLIC BLOOD PRESSURE: 103 MMHG

## 2024-06-03 LAB
ALBUMIN SERPL ELPH-MCNC: 3.7 G/DL — SIGNIFICANT CHANGE UP (ref 3.3–5)
ALP SERPL-CCNC: 55 U/L — SIGNIFICANT CHANGE UP (ref 40–120)
ALT FLD-CCNC: 5 U/L — SIGNIFICANT CHANGE UP (ref 4–33)
ANION GAP SERPL CALC-SCNC: 11 MMOL/L — SIGNIFICANT CHANGE UP (ref 7–14)
APPEARANCE UR: CLEAR — SIGNIFICANT CHANGE UP
AST SERPL-CCNC: 10 U/L — SIGNIFICANT CHANGE UP (ref 4–32)
BACTERIA # UR AUTO: NEGATIVE /HPF — SIGNIFICANT CHANGE UP
BASOPHILS # BLD AUTO: 0.03 K/UL — SIGNIFICANT CHANGE UP (ref 0–0.2)
BASOPHILS NFR BLD AUTO: 0.3 % — SIGNIFICANT CHANGE UP (ref 0–2)
BILIRUB SERPL-MCNC: 0.3 MG/DL — SIGNIFICANT CHANGE UP (ref 0.2–1.2)
BILIRUB UR-MCNC: NEGATIVE — SIGNIFICANT CHANGE UP
BUN SERPL-MCNC: 7 MG/DL — SIGNIFICANT CHANGE UP (ref 7–23)
CALCIUM SERPL-MCNC: 9 MG/DL — SIGNIFICANT CHANGE UP (ref 8.4–10.5)
CAST: 2 /LPF — SIGNIFICANT CHANGE UP (ref 0–4)
CHLORIDE SERPL-SCNC: 105 MMOL/L — SIGNIFICANT CHANGE UP (ref 98–107)
CO2 SERPL-SCNC: 22 MMOL/L — SIGNIFICANT CHANGE UP (ref 22–31)
COLOR SPEC: YELLOW — SIGNIFICANT CHANGE UP
CREAT SERPL-MCNC: 0.87 MG/DL — SIGNIFICANT CHANGE UP (ref 0.5–1.3)
DIFF PNL FLD: ABNORMAL
EGFR: 87 ML/MIN/1.73M2 — SIGNIFICANT CHANGE UP
EOSINOPHIL # BLD AUTO: 0.18 K/UL — SIGNIFICANT CHANGE UP (ref 0–0.5)
EOSINOPHIL NFR BLD AUTO: 1.9 % — SIGNIFICANT CHANGE UP (ref 0–6)
GLUCOSE SERPL-MCNC: 102 MG/DL — HIGH (ref 70–99)
GLUCOSE UR QL: NEGATIVE MG/DL — SIGNIFICANT CHANGE UP
HCG SERPL-ACNC: SIGNIFICANT CHANGE UP MIU/ML
HCT VFR BLD CALC: 31.9 % — LOW (ref 34.5–45)
HGB BLD-MCNC: 10.7 G/DL — LOW (ref 11.5–15.5)
IANC: 6.23 K/UL — SIGNIFICANT CHANGE UP (ref 1.8–7.4)
IMM GRANULOCYTES NFR BLD AUTO: 0.3 % — SIGNIFICANT CHANGE UP (ref 0–0.9)
KETONES UR-MCNC: NEGATIVE MG/DL — SIGNIFICANT CHANGE UP
LEUKOCYTE ESTERASE UR-ACNC: ABNORMAL
LIDOCAIN IGE QN: 24 U/L — SIGNIFICANT CHANGE UP (ref 7–60)
LYMPHOCYTES # BLD AUTO: 2.25 K/UL — SIGNIFICANT CHANGE UP (ref 1–3.3)
LYMPHOCYTES # BLD AUTO: 23.6 % — SIGNIFICANT CHANGE UP (ref 13–44)
MCHC RBC-ENTMCNC: 27.4 PG — SIGNIFICANT CHANGE UP (ref 27–34)
MCHC RBC-ENTMCNC: 33.5 GM/DL — SIGNIFICANT CHANGE UP (ref 32–36)
MCV RBC AUTO: 81.6 FL — SIGNIFICANT CHANGE UP (ref 80–100)
MONOCYTES # BLD AUTO: 0.82 K/UL — SIGNIFICANT CHANGE UP (ref 0–0.9)
MONOCYTES NFR BLD AUTO: 8.6 % — SIGNIFICANT CHANGE UP (ref 2–14)
NEUTROPHILS # BLD AUTO: 6.23 K/UL — SIGNIFICANT CHANGE UP (ref 1.8–7.4)
NEUTROPHILS NFR BLD AUTO: 65.3 % — SIGNIFICANT CHANGE UP (ref 43–77)
NITRITE UR-MCNC: NEGATIVE — SIGNIFICANT CHANGE UP
NRBC # BLD: 0 /100 WBCS — SIGNIFICANT CHANGE UP (ref 0–0)
NRBC # FLD: 0 K/UL — SIGNIFICANT CHANGE UP (ref 0–0)
PH UR: 6 — SIGNIFICANT CHANGE UP (ref 5–8)
PLATELET # BLD AUTO: 238 K/UL — SIGNIFICANT CHANGE UP (ref 150–400)
POTASSIUM SERPL-MCNC: 3.8 MMOL/L — SIGNIFICANT CHANGE UP (ref 3.5–5.3)
POTASSIUM SERPL-SCNC: 3.8 MMOL/L — SIGNIFICANT CHANGE UP (ref 3.5–5.3)
PROT SERPL-MCNC: 6.4 G/DL — SIGNIFICANT CHANGE UP (ref 6–8.3)
PROT UR-MCNC: 30 MG/DL
RBC # BLD: 3.91 M/UL — SIGNIFICANT CHANGE UP (ref 3.8–5.2)
RBC # FLD: 14.6 % — HIGH (ref 10.3–14.5)
RBC CASTS # UR COMP ASSIST: 1 /HPF — SIGNIFICANT CHANGE UP (ref 0–4)
REVIEW: SIGNIFICANT CHANGE UP
SODIUM SERPL-SCNC: 138 MMOL/L — SIGNIFICANT CHANGE UP (ref 135–145)
SP GR SPEC: 1.02 — SIGNIFICANT CHANGE UP (ref 1–1.03)
SQUAMOUS # UR AUTO: 5 /HPF — SIGNIFICANT CHANGE UP (ref 0–5)
UROBILINOGEN FLD QL: 0.2 MG/DL — SIGNIFICANT CHANGE UP (ref 0.2–1)
WBC # BLD: 9.54 K/UL — SIGNIFICANT CHANGE UP (ref 3.8–10.5)
WBC # FLD AUTO: 9.54 K/UL — SIGNIFICANT CHANGE UP (ref 3.8–10.5)
WBC UR QL: 9 /HPF — HIGH (ref 0–5)

## 2024-06-03 PROCEDURE — 76801 OB US < 14 WKS SINGLE FETUS: CPT | Mod: 26

## 2024-06-03 PROCEDURE — 99285 EMERGENCY DEPT VISIT HI MDM: CPT

## 2024-06-03 PROCEDURE — 99283 EMERGENCY DEPT VISIT LOW MDM: CPT

## 2024-06-03 RX ORDER — KETOROLAC TROMETHAMINE 30 MG/ML
15 SYRINGE (ML) INJECTION ONCE
Refills: 0 | Status: DISCONTINUED | OUTPATIENT
Start: 2024-06-03 | End: 2024-06-03

## 2024-06-03 RX ORDER — SODIUM CHLORIDE 9 MG/ML
1000 INJECTION, SOLUTION INTRAVENOUS ONCE
Refills: 0 | Status: COMPLETED | OUTPATIENT
Start: 2024-06-03 | End: 2024-06-03

## 2024-06-03 RX ORDER — OXYCODONE AND ACETAMINOPHEN 5; 325 MG/1; MG/1
1 TABLET ORAL
Qty: 9 | Refills: 0
Start: 2024-06-03 | End: 2024-06-05

## 2024-06-03 RX ORDER — SENNA PLUS 8.6 MG/1
1 TABLET ORAL
Qty: 14 | Refills: 0
Start: 2024-06-03 | End: 2024-06-09

## 2024-06-03 RX ADMIN — Medication 15 MILLIGRAM(S): at 05:34

## 2024-06-03 RX ADMIN — SODIUM CHLORIDE 1000 MILLILITER(S): 9 INJECTION, SOLUTION INTRAVENOUS at 05:33

## 2024-06-03 RX ADMIN — Medication 15 MILLIGRAM(S): at 07:56

## 2024-06-03 RX ADMIN — SODIUM CHLORIDE 1000 MILLILITER(S): 9 INJECTION, SOLUTION INTRAVENOUS at 07:57

## 2024-06-03 NOTE — ED PROVIDER NOTE - PATIENT PORTAL LINK FT
You can access the FollowMyHealth Patient Portal offered by [facility] by registering at the following website: [email or web address]. By joining "[employer],"’s FollowMyHealth portal, you will also be able to view your health information using other applications (apps) compatible with our system.

## 2024-06-03 NOTE — ED ADULT NURSE REASSESSMENT NOTE - NS ED NURSE REASSESS COMMENT FT1
Patient complaining of pain, took tylenol around 2am. MD guillory at bedside discussing plan of care and options. Patient requesting toradol, MD guillory discussing risks and benefits, patient still requesting toradol. Call bell within reach.
Break RN: Patient awake and resting in stretcher; vital signs stable, respirations even and unlabored, no signs/symptoms of acute distress. Patient denies pain and offers no complaints at this time. Safety measures in place, will provide care as needed.

## 2024-06-03 NOTE — ED PROVIDER NOTE - OBJECTIVE STATEMENT
38-year-old female with past medical history of uterine fibroids, recently found to be pregnant, 5 weeks LMP 5/24, presented to ED with worsening back pain.  Patient has been seen multiple times since May 23 for combination of back pain flank pain, urinary retention.  During this time patient was found to have uterine fibroids which cause obstruction resulting in her urinary retention.  Patient had a pessary placed since last time she was seen in the ED.  Due to continued urine retention, patient had Cordero placed.  Patient reports continued drainage from the Cordero.  Patient has an appointment with urogyn today for further management for pessary.  Denies any fever, chills, bowel incontinence, saddle anesthesia.  Patient states that she felt constipated and used stool softener with resultant diarrhea.

## 2024-06-03 NOTE — ED PROVIDER NOTE - PROGRESS NOTE DETAILS
ODILON: Patient was reassessed and pain is still 10/10.  Will provide continued pain medication and IV fluids and reassess. OB re consulted for reevaluation due to ongoing patient symptoms and recommended TVUS. TVUS ordered. Patient’s prescription sent to their pharmacy indicated during interview. Improvement on symptoms. Passed PO challenge. Spoke to patient/family about results including incidental findings. Plan to discharge patient. Patient given GYN and PCP follow up and return precautions. Patient/family agrees with plan. OB returned with recs after reviewing chart and TVUS, recommended percoccet and follow up outpatient. Gong: Case discussed with patient prior to discharge to ensure questions regarding viability of pregnancy in the setting of underlying fibroids were addressed by the GYN team.  Cased discussed with GYN team and recommendations for follow up with outpt GYN being expedited.

## 2024-06-03 NOTE — CONSULT NOTE ADULT - SUBJECTIVE AND OBJECTIVE BOX
AARON FIELD  38y  Female [id number]    HPI: 37 y/o  LMP  (5w5d) presenting with increasing abdominal/back pain since 2am. Pt s/p multiple ED visits i/s/o PUL with fibroid uterus c/b urinary retention and severe abdominal/back pain. Pt was seen on  in the ED and appointment was made with Urogynecology on  where pt had pessary replaced, which relieved urinary obstruction. Pt states that evening she was unable to urinate and had to straight cath herself x2, then left the Cordero in place. States the pain was somewhat manageable after that with Tylenol ATC until ~2a this morning when pain became 10/10 and she presented to the ED. Also reports constipation - took senna/colace yesterday and had bowel movement with some relief.    In the ED today, TVUS demonstrates SLIUP and known fibroid uterus. GYN consulted for pain management i/s/o early pregnancy with fibroid uterus. At time of initial GYN evaluation ~10a, pt states her pain is 7/10 and was somewhat improved with Toradol. At time of re-evaluation with attending ~1p, pt states her pain is 5/10. This is a highly desired pregnancy. She denies vaginal bleeding, nausea, vomiting, fevers, chills, SOB. She has not yet established care with a GYN but has an appointment with Dr. Mcdermott on .     Name of GYN Physician: [facility] (Dr. Garcia); has initial prenatal appt with Dr. Mcdermott at end of   OBHx: x1 eTOP  GynHx: +newly diagnosed fibroids. Denies cysts, endometriosis, STIs, Abnormal pap smears   PMH: Denies  PSH: Denies  Meds: Denies  Allx: NKDA  Social History:  Denies smoking use, drug use, alcohol use.      Vital Signs Last 24 Hrs  T(C): 36.8 (2024 09:07), Max: 36.8 (2024 03:23)  T(F): 98.3 (2024 09:07), Max: 98.3 (2024 03:23)  HR: 82 (2024 09:07) (74 - 88)  BP: 108/61 (2024 09:07) (105/60 - 112/72)  BP(mean): 75 (2024 09:07) (75 - 75)  RR: 16 (2024 09:07) (15 - 16)  SpO2: 100% (2024 09:07) (100% - 100%)    Parameters below as of 2024 09:07  Patient On (Oxygen Delivery Method): room air        Physical Exam:   General: sitting in bed, NAD   HEENT: neck supple, full ROM  CV: clinically well perfused  Lungs: unlabored respirations  Back: +mild R CVA tenderness  Abd: Soft, non-tender, gravid, enlarged fibroid uterus  : No bleeding on pad. External labia wnl. Internal pelvic exam deferred. Cordero in place draining clear yellow urine.  Ext: non-tender b/l, no edema       LABS:               10.7   9.54  )-----------( 238      ( 2024 04:08 )             31.9     06-03    138  |  105  |  7   ----------------------------<  102<H>  3.8   |  22  |  0.87    Ca    9.0      2024 04:08    TPro  6.4  /  Alb  3.7  /  TBili  0.3  /  DBili  x   /  AST  10  /  ALT  5   /  AlkPhos  55  06-03    Urinalysis Basic - ( 2024 05:00 )    Color: Yellow / Appearance: Clear / S.017 / pH: x  Gluc: x / Ketone: Negative mg/dL  / Bili: Negative / Urobili: 0.2 mg/dL   Blood: x / Protein: 30 mg/dL / Nitrite: Negative   Leuk Esterase: Trace / RBC: 1 /HPF / WBC 9 /HPF   Sq Epi: x / Non Sq Epi: 5 /HPF / Bacteria: Negative /HPF        RADIOLOGY & ADDITIONAL STUDIES:  < from: US OB <=14 Weeks, First Gestation (24 @ 09:40) >  ACC: [id number] EXAM:  US OB LES THAN 14 WKS 1ST GEST   ORDERED BY:   NIKIA WRIGHT     PROCEDURE DATE:  2024          INTERPRETATION:  CLINICAL INFORMATION: Evaluate pregnancy.    LMP: 2024    Estimated Gestational Age by LMP: 2024    COMPARISON: None available.    Transabdominal pelvic sonogram only. Patient declined transvaginal exam.    FINDINGS:  Uterus: Anteverted myomatous uterus with single live intrauterine   gestation. Several myomas measure as follows:  *  Right anterior broad-based subserosal fibroid measures 2.4 x 1.2 x 3.0   cm.  *  Right anterior broad-based subserosal fibroid measures 3.5 x 2.1 x 3.3   cm.  *  Left-sided broad-based subserosal fibroid measures 4.0 x 3.2 x 3.6 cm.  *  Posterior intramuralfibroid measures 8.9 x 8.6 x 9.5 cm.    Gestational Sac Size (mean): 18.0 mm  Gestational Sac Shape : Normal.  Crown Rump Length: 0.34 cm  Estimated Gestational Age: 6 weeks 3 days  Yolk Sac: Normal  Fetal Heart Rate: 110 bpm    Right ovary: 5.6 cm x1.8 cm x 3.6 cm. Within normal limits.  Left ovary: 2.4 cm x 1.9 cm x 1.6 cm. Within normal limits.    Fluid: None.    IMPRESSION:  Single live intrauterine gestation.  Estimated gestational age of 6 weeks 3 days        --- End of Report ---            MELVI CORONEL MD; Attending Radiologist  This document has been electronically signed. Taran  3 2024 10:02AM    < end of copied text >

## 2024-06-03 NOTE — ED PROVIDER NOTE - ATTENDING CONTRIBUTION TO CARE
HPI: 38-year-old female with past medical history of uterine fibroids, recently found to be pregnant, 5 weeks LMP , presented to ED with worsening back pain.  Patient has been seen multiple times since May 23 for combination of back pain flank pain, urinary retention.  During this time patient was found to have uterine fibroids which cause obstruction resulting in her urinary retention.  Patient had a pessary placed since last time she was seen in the ED.  Due to continued urine retention, patient had Cordero placed.  Patient reports continued drainage from the Cordero.  Patient has an appointment with urogyn today for further management for pessary.  Denies any fever, chills, bowel incontinence, saddle anesthesia.  Patient states that she felt constipated and used stool softener with resultant diarrhea.    EXAM: Uncomfortable appearing pacing in ED.  Nontender back.  Abdomen is gravid.  Cordero in place draining clear urine.  No blood.  No open wounds and skin is intact.    MDM: 38-year-old  6 weeks pregnant by dates that has a history of uterine fibroids recently found to be pregnant that presents with worsening back pain.  This is patient's fifth ED visit previous MRI shows multiple fibroids some degenerating also constriction of right renal collecting system leading to patient unable to urinate now with a Cordero placed by urology.  Patient back with pain taking Tylenol at home 1 g prior to arrival without any pain relief.  At this time patient is amenable to Toradol for pain relief despite possible risk to fetus.  Patient has been given fentanyl in the past visits because of her continued pain now also with constipation not passing stools for multiple days.  At this time will obtain labs and urine but also provide pain medications and IV fluids and reassess.

## 2024-06-03 NOTE — CONSULT NOTE ADULT - ATTENDING COMMENTS
Pt seen by me.  Discussed pain medication options and pregnancy plans.  All questions and concerns were addressed

## 2024-06-03 NOTE — ED PROVIDER NOTE - CLINICAL SUMMARY MEDICAL DECISION MAKING FREE TEXT BOX
38-year-old female with past medical history of uterine fibroids, recently found to be pregnant, 5 weeks LMP 5/24, presented to ED with worsening back pain.  Hemodynamically stable.  Physical exam heart regular rate and rhythm, lungs clear to station, abdomen with diffuse lower abdominal tenderness.  Patient with known degenerative fibroid is likely the cause of her symptoms.  Will get labs including CBC, CMP, hCG, lipase, UA.  Patient is on nitrofurantoin and given recurrent cathing.

## 2024-06-03 NOTE — ED PROVIDER NOTE - PHYSICAL EXAMINATION
Const: not in acute distress  Eyes: no conjunctival injection  HEENT: Head NCAT, Moist MM.  Neck: Trachea midline.   CVS: +S1/S2, Peripheral pulses 2+ and equal in all extremities.  RESP: Unlabored respiratory effort. Clear to auscultation bilaterally.  GI: Diffuse lower abdominal tenderness/Nondistended, No CVA tenderness b/l.   MSK: Normocephalic/Atraumatic, No midline spinal tenderness.  No Lower Extremities edema b/l.   Skin: Intact.   Neuro: Motor & Sensation grossly intact.  Psych: Awake, Alert, & Cooperative

## 2024-06-03 NOTE — ED ADULT NURSE NOTE - NSFALLUNIVINTERV_ED_ALL_ED
Bed/Stretcher in lowest position, wheels locked, appropriate side rails in place/Call bell, personal items and telephone in reach/Instruct patient to call for assistance before getting out of bed/chair/stretcher/Non-slip footwear applied when patient is off stretcher/[place] to call system/Physically safe environment - no spills, clutter or unnecessary equipment/Purposeful proactive rounding/Room/bathroom lighting operational, light cord in reach

## 2024-06-03 NOTE — ED ADULT NURSE NOTE - OBJECTIVE STATEMENT
Patient received in spot 1. A&o4. Ambulatory. Denies past medical history. Came into ED with complaints lower back pain. States being a few weeks pregnant seen multiple times in ED for urinary retention and abdominal pain. Patient endorsing constipation, N/V. Patient presents with michael placed from ED a few days ago. Respirations even and unlabored. Denies SOB, chest pain, fevers, diarrhea. No signs of acute distress noted. Comfort and safety maintained. Call bell within reach. Patient received in spot 1. A&o4. Ambulatory. Denies past medical history. Came into ED with complaints lower back pain. States being a few weeks pregnant seen multiple times in ED for urinary retention and abdominal pain. Patient endorsing constipation, N/V. Patient presents with michael placed herself after outpatient urology on friday. Respirations even and unlabored. Denies SOB, chest pain, fevers, diarrhea. No signs of acute distress noted. Comfort and safety maintained. Call bell within reach.

## 2024-06-03 NOTE — ED PROVIDER NOTE - NSFOLLOWUPINSTRUCTIONS_ED_ALL_ED_FT
You were seen in the Emergency Department for pelvic pain.     1) Advance activity as tolerated.   2) New prescription sent to pharmacy indicated in interview take prescription as prescribed. Continue all previously prescribed medications as directed.    3) Follow up with GYN and your primary care physician in 2-3 days - take copies of your results. You will be called by GYN to set up an appointment.  4) Return to the Emergency Department for worsening or persistent symptoms, and/or ANY NEW OR CONCERNING SYMPTOMS.    Abdominal Pain    Many things can cause abdominal pain. Many times, abdominal pain is not caused by a disease and will improve without treatment. Your health care provider will do a physical exam to determine if there is a dangerous cause of your pain; blood tests and imaging may help determine the cause of your pain. However, in many cases, no cause may be found and you may need further testing as an outpatient. Monitor your abdominal pain for any changes.     SEEK IMMEDIATE MEDICAL CARE IF YOU HAVE ANY OF THE FOLLOWING SYMPTOMS: worsening abdominal pain, uncontrollable vomiting, profuse diarrhea, inability to have bowel movements or pass gas, black or bloody stools, fever accompanying chest pain or back pain, or fainting. These symptoms may represent a serious problem that is an emergency. Do not wait to see if the symptoms will go away. Get medical help right away. Call 911 and do not drive yourself to the hospital.     Constipation    Constipation is when a person has fewer than three bowel movements a week, has difficulty having a bowel movement, or has stools that are dry, hard, or larger than normal. Other symptoms can include abdominal pain or bloating. As people grow older, constipation is more common. A low-fiber diet, not taking in enough fluids, and taking certain medicines, including opioid painkillers, may make constipation worse. Treatment varies but may include dietary modifications (more fiber-rich foods), lifestyle modifications, and possible medications.     SEEK IMMEDIATE MEDICAL CARE IF YOU HAVE ANY OF THE FOLLOWING SYMPTOMS: bright red blood in your stool, constipation for longer than 4 days, abdominal or rectal pain, unexplained weight loss, or inability to pass gas.

## 2024-06-03 NOTE — ED ADULT TRIAGE NOTE - CHIEF COMPLAINT QUOTE
presents C/O lower back pain +nausea/ vomiting. No complaints of chest pain, headache,  dizziness,   SOB, fever, chills verbalized. no Phx

## 2024-06-04 ENCOUNTER — EMERGENCY (EMERGENCY)
Facility: HOSPITAL | Age: 39
LOS: 1 days | Discharge: HOME | End: 2024-06-04
Attending: EMERGENCY MEDICINE | Admitting: EMERGENCY MEDICINE
Payer: COMMERCIAL

## 2024-06-04 VITALS
SYSTOLIC BLOOD PRESSURE: 107 MMHG | DIASTOLIC BLOOD PRESSURE: 68 MMHG | TEMPERATURE: 98 F | RESPIRATION RATE: 20 BRPM | OXYGEN SATURATION: 100 % | HEART RATE: 74 BPM | HEIGHT: 65 IN

## 2024-06-04 LAB
ADD ON TEST-SPECIMEN IN LAB: SIGNIFICANT CHANGE UP
ALBUMIN SERPL ELPH-MCNC: 3.8 G/DL — SIGNIFICANT CHANGE UP (ref 3.3–5)
ALP SERPL-CCNC: 57 U/L — SIGNIFICANT CHANGE UP (ref 40–120)
ALT FLD-CCNC: 6 U/L — SIGNIFICANT CHANGE UP (ref 4–33)
ANION GAP SERPL CALC-SCNC: 12 MMOL/L — SIGNIFICANT CHANGE UP (ref 7–14)
APPEARANCE UR: ABNORMAL
AST SERPL-CCNC: 11 U/L — SIGNIFICANT CHANGE UP (ref 4–32)
BASOPHILS # BLD AUTO: 0.03 K/UL — SIGNIFICANT CHANGE UP (ref 0–0.2)
BASOPHILS NFR BLD AUTO: 0.3 % — SIGNIFICANT CHANGE UP (ref 0–2)
BILIRUB SERPL-MCNC: <0.2 MG/DL — SIGNIFICANT CHANGE UP (ref 0.2–1.2)
BILIRUB UR-MCNC: NEGATIVE — SIGNIFICANT CHANGE UP
BUN SERPL-MCNC: 10 MG/DL — SIGNIFICANT CHANGE UP (ref 7–23)
CALCIUM SERPL-MCNC: 9.3 MG/DL — SIGNIFICANT CHANGE UP (ref 8.4–10.5)
CHLORIDE SERPL-SCNC: 104 MMOL/L — SIGNIFICANT CHANGE UP (ref 98–107)
CO2 SERPL-SCNC: 22 MMOL/L — SIGNIFICANT CHANGE UP (ref 22–31)
COLOR SPEC: YELLOW — SIGNIFICANT CHANGE UP
CREAT SERPL-MCNC: 0.79 MG/DL — SIGNIFICANT CHANGE UP (ref 0.5–1.3)
CULTURE RESULTS: NO GROWTH — SIGNIFICANT CHANGE UP
DIFF PNL FLD: ABNORMAL
EGFR: 98 ML/MIN/1.73M2 — SIGNIFICANT CHANGE UP
EOSINOPHIL # BLD AUTO: 0.14 K/UL — SIGNIFICANT CHANGE UP (ref 0–0.5)
EOSINOPHIL NFR BLD AUTO: 1.3 % — SIGNIFICANT CHANGE UP (ref 0–6)
GLUCOSE SERPL-MCNC: 112 MG/DL — HIGH (ref 70–99)
GLUCOSE UR QL: NEGATIVE MG/DL — SIGNIFICANT CHANGE UP
HCG SERPL-ACNC: SIGNIFICANT CHANGE UP MIU/ML
HCT VFR BLD CALC: 33 % — LOW (ref 34.5–45)
HGB BLD-MCNC: 11.1 G/DL — LOW (ref 11.5–15.5)
IANC: 7.28 K/UL — SIGNIFICANT CHANGE UP (ref 1.8–7.4)
IMM GRANULOCYTES NFR BLD AUTO: 0.4 % — SIGNIFICANT CHANGE UP (ref 0–0.9)
KETONES UR-MCNC: NEGATIVE MG/DL — SIGNIFICANT CHANGE UP
LEUKOCYTE ESTERASE UR-ACNC: NEGATIVE — SIGNIFICANT CHANGE UP
LYMPHOCYTES # BLD AUTO: 2.48 K/UL — SIGNIFICANT CHANGE UP (ref 1–3.3)
LYMPHOCYTES # BLD AUTO: 23.1 % — SIGNIFICANT CHANGE UP (ref 13–44)
MCHC RBC-ENTMCNC: 27.5 PG — SIGNIFICANT CHANGE UP (ref 27–34)
MCHC RBC-ENTMCNC: 33.6 GM/DL — SIGNIFICANT CHANGE UP (ref 32–36)
MCV RBC AUTO: 81.9 FL — SIGNIFICANT CHANGE UP (ref 80–100)
MONOCYTES # BLD AUTO: 0.77 K/UL — SIGNIFICANT CHANGE UP (ref 0–0.9)
MONOCYTES NFR BLD AUTO: 7.2 % — SIGNIFICANT CHANGE UP (ref 2–14)
NEUTROPHILS # BLD AUTO: 7.28 K/UL — SIGNIFICANT CHANGE UP (ref 1.8–7.4)
NEUTROPHILS NFR BLD AUTO: 67.7 % — SIGNIFICANT CHANGE UP (ref 43–77)
NITRITE UR-MCNC: NEGATIVE — SIGNIFICANT CHANGE UP
NRBC # BLD: 0 /100 WBCS — SIGNIFICANT CHANGE UP (ref 0–0)
NRBC # FLD: 0 K/UL — SIGNIFICANT CHANGE UP (ref 0–0)
PH UR: 5.5 — SIGNIFICANT CHANGE UP (ref 5–8)
PLATELET # BLD AUTO: 237 K/UL — SIGNIFICANT CHANGE UP (ref 150–400)
POTASSIUM SERPL-MCNC: 4.1 MMOL/L — SIGNIFICANT CHANGE UP (ref 3.5–5.3)
POTASSIUM SERPL-SCNC: 4.1 MMOL/L — SIGNIFICANT CHANGE UP (ref 3.5–5.3)
PROT SERPL-MCNC: 6.6 G/DL — SIGNIFICANT CHANGE UP (ref 6–8.3)
PROT UR-MCNC: SIGNIFICANT CHANGE UP MG/DL
RBC # BLD: 4.03 M/UL — SIGNIFICANT CHANGE UP (ref 3.8–5.2)
RBC # FLD: 14.6 % — HIGH (ref 10.3–14.5)
SODIUM SERPL-SCNC: 138 MMOL/L — SIGNIFICANT CHANGE UP (ref 135–145)
SP GR SPEC: 1.02 — SIGNIFICANT CHANGE UP (ref 1–1.03)
SPECIMEN SOURCE: SIGNIFICANT CHANGE UP
UROBILINOGEN FLD QL: 0.2 MG/DL — SIGNIFICANT CHANGE UP (ref 0.2–1)
WBC # BLD: 10.74 K/UL — HIGH (ref 3.8–10.5)
WBC # FLD AUTO: 10.74 K/UL — HIGH (ref 3.8–10.5)

## 2024-06-04 PROCEDURE — 99223 1ST HOSP IP/OBS HIGH 75: CPT

## 2024-06-04 PROCEDURE — 74181 MRI ABDOMEN W/O CONTRAST: CPT | Mod: 26,MC

## 2024-06-04 PROCEDURE — 93010 ELECTROCARDIOGRAM REPORT: CPT

## 2024-06-04 RX ORDER — MORPHINE SULFATE 50 MG/1
4 CAPSULE, EXTENDED RELEASE ORAL ONCE
Refills: 0 | Status: DISCONTINUED | OUTPATIENT
Start: 2024-06-04 | End: 2024-06-04

## 2024-06-04 RX ORDER — OXYCODONE HYDROCHLORIDE 5 MG/1
5 TABLET ORAL ONCE
Refills: 0 | Status: DISCONTINUED | OUTPATIENT
Start: 2024-06-04 | End: 2024-06-04

## 2024-06-04 RX ORDER — OXYCODONE HYDROCHLORIDE 5 MG/1
5 TABLET ORAL EVERY 6 HOURS
Refills: 0 | Status: DISCONTINUED | OUTPATIENT
Start: 2024-06-04 | End: 2024-06-04

## 2024-06-04 RX ORDER — ACETAMINOPHEN 500 MG
975 TABLET ORAL EVERY 6 HOURS
Refills: 0 | Status: DISCONTINUED | OUTPATIENT
Start: 2024-06-04 | End: 2024-06-07

## 2024-06-04 RX ORDER — POLYETHYLENE GLYCOL 3350 17 G/17G
17 POWDER, FOR SOLUTION ORAL ONCE
Refills: 0 | Status: COMPLETED | OUTPATIENT
Start: 2024-06-04 | End: 2024-06-04

## 2024-06-04 RX ORDER — ACETAMINOPHEN 500 MG
1000 TABLET ORAL ONCE
Refills: 0 | Status: COMPLETED | OUTPATIENT
Start: 2024-06-04 | End: 2024-06-04

## 2024-06-04 RX ORDER — ONDANSETRON 8 MG/1
4 TABLET, FILM COATED ORAL ONCE
Refills: 0 | Status: COMPLETED | OUTPATIENT
Start: 2024-06-04 | End: 2024-06-04

## 2024-06-04 RX ORDER — MORPHINE SULFATE 50 MG/1
2 CAPSULE, EXTENDED RELEASE ORAL ONCE
Refills: 0 | Status: DISCONTINUED | OUTPATIENT
Start: 2024-06-04 | End: 2024-06-04

## 2024-06-04 RX ORDER — ONDANSETRON 8 MG/1
4 TABLET, FILM COATED ORAL EVERY 6 HOURS
Refills: 0 | Status: DISCONTINUED | OUTPATIENT
Start: 2024-06-04 | End: 2024-06-07

## 2024-06-04 RX ORDER — SODIUM CHLORIDE 9 MG/ML
1000 INJECTION INTRAMUSCULAR; INTRAVENOUS; SUBCUTANEOUS
Refills: 0 | Status: DISCONTINUED | OUTPATIENT
Start: 2024-06-04 | End: 2024-06-07

## 2024-06-04 RX ORDER — INDOMETHACIN 50 MG
25 CAPSULE ORAL ONCE
Refills: 0 | Status: COMPLETED | OUTPATIENT
Start: 2024-06-04 | End: 2024-06-04

## 2024-06-04 RX ADMIN — Medication 400 MILLIGRAM(S): at 05:08

## 2024-06-04 RX ADMIN — OXYCODONE HYDROCHLORIDE 5 MILLIGRAM(S): 5 TABLET ORAL at 22:30

## 2024-06-04 RX ADMIN — OXYCODONE HYDROCHLORIDE 5 MILLIGRAM(S): 5 TABLET ORAL at 21:49

## 2024-06-04 RX ADMIN — MORPHINE SULFATE 4 MILLIGRAM(S): 50 CAPSULE, EXTENDED RELEASE ORAL at 18:28

## 2024-06-04 RX ADMIN — ONDANSETRON 4 MILLIGRAM(S): 8 TABLET, FILM COATED ORAL at 18:49

## 2024-06-04 RX ADMIN — Medication 975 MILLIGRAM(S): at 12:43

## 2024-06-04 RX ADMIN — Medication 25 MILLIGRAM(S): at 06:15

## 2024-06-04 RX ADMIN — Medication 975 MILLIGRAM(S): at 13:30

## 2024-06-04 RX ADMIN — ONDANSETRON 4 MILLIGRAM(S): 8 TABLET, FILM COATED ORAL at 07:28

## 2024-06-04 RX ADMIN — Medication 25 MILLIGRAM(S): at 05:35

## 2024-06-04 RX ADMIN — SODIUM CHLORIDE 125 MILLILITER(S): 9 INJECTION INTRAMUSCULAR; INTRAVENOUS; SUBCUTANEOUS at 21:52

## 2024-06-04 RX ADMIN — MORPHINE SULFATE 2 MILLIGRAM(S): 50 CAPSULE, EXTENDED RELEASE ORAL at 15:28

## 2024-06-04 RX ADMIN — Medication 975 MILLIGRAM(S): at 18:48

## 2024-06-04 RX ADMIN — ONDANSETRON 4 MILLIGRAM(S): 8 TABLET, FILM COATED ORAL at 13:10

## 2024-06-04 RX ADMIN — POLYETHYLENE GLYCOL 3350 17 GRAM(S): 17 POWDER, FOR SOLUTION ORAL at 10:36

## 2024-06-04 RX ADMIN — Medication 975 MILLIGRAM(S): at 19:50

## 2024-06-04 RX ADMIN — Medication 1000 MILLIGRAM(S): at 06:15

## 2024-06-04 RX ADMIN — SODIUM CHLORIDE 125 MILLILITER(S): 9 INJECTION INTRAMUSCULAR; INTRAVENOUS; SUBCUTANEOUS at 13:11

## 2024-06-04 RX ADMIN — OXYCODONE HYDROCHLORIDE 5 MILLIGRAM(S): 5 TABLET ORAL at 10:10

## 2024-06-04 RX ADMIN — MORPHINE SULFATE 2 MILLIGRAM(S): 50 CAPSULE, EXTENDED RELEASE ORAL at 16:00

## 2024-06-04 RX ADMIN — MORPHINE SULFATE 4 MILLIGRAM(S): 50 CAPSULE, EXTENDED RELEASE ORAL at 17:06

## 2024-06-04 NOTE — ED CDU PROVIDER INITIAL DAY NOTE - ATTENDING APP SHARED VISIT CONTRIBUTION OF CARE
I have made the decision to admit this patient to the CDU as documented in my Provider note I have reviewed the note  written by the CDU Physician Assistant, on that visit day. I have supervised and participated as necessary in the performance of procedures indicated for patient management and was available at all phases of the patient´s visit when needed.    Please see the  provider note for the details of the decision to admit  Vital Signs Stable  PE unchanged at time of admission  Patient admitted for pain management serial exams gyn input and possible MRI after multiple episodes of abdominal pain outlined previously (early IUP c/b large fibroids urinary incontinence pessary/michael and intractable back pain  Patient to be evaluated by GYN await reccs will reassess

## 2024-06-04 NOTE — ED PROVIDER NOTE - PROGRESS NOTE DETAILS
Aydin PGY3: spoke to GYN who recommended to continue with PO meds that were previously described in yesterdays' note & if continues to be in pain, recommend cdu vs med admit. Girma HENRY PGY-3: received sign out on this patient. pt requested bladder us, performed, collapsed on michael balloon. s/w radiology regarding imaging, for appendicitis would not require contrast w/ MRI. s/w pt advised of plan. CDU w/o beds. Girma, PGY-3, EM: Patient was sent to MRI, returned as she refused because she was in too much pain.  Hide received medications was not due for anything at that time, after review of the EMR it appears that she did not receive the oxycodone's it is listed that she refused the medication.  Will reorder a dose of oxycodone, patient agitated as she states we are not treating her pain.  Advised that given she is pregnant there are limited medications we can use to treat her pain.  Will give additional dose and encouraged to go to MRI. at this time hospitalist would likely to attempt another round of pain medications for MRI prior to admission. Girma, PGY-3, EM: Patient was sent to MRI, returned as she refused because she was in too much pain.  had received medications was not due for anything at that time, after review of the EMR it appears that she did not receive the oxycodone's it is listed that she refused the medication.  Will reorder a dose of oxycodone, patient agitated as she states we are not treating her pain.  Advised that given she is pregnant there are limited medications we can use to treat her pain.  Will give additional dose and encouraged to go to MRI. at this time hospitalist would likely to attempt another round of pain medications for MRI prior to admission. Girma, PGY-3, EM: pt more comfortable now. MRI now does not know when she would be able to go again. s/w CDU again who will come see her.

## 2024-06-04 NOTE — ED ADULT TRIAGE NOTE - CHIEF COMPLAINT QUOTE
seen and DC yesterday for same complaint: p/w lower back pain, generalized weakness and abd pain- approx 5-6 weeks pregnant- LMP 4/24- no pmhx

## 2024-06-04 NOTE — ED ADULT NURSE REASSESSMENT NOTE - NS ED NURSE REASSESS COMMENT FT1
ER pt requesting pain meds was able to ambulate to nursing station also requesting stool softener.   Omega N/V.     Sofía Dooley RN
ER report given to CDU primary RN, pt pending MRI.             Sofía Dooley RN
Er report taking from night shift primary RN pt is AOx3 stating came for ABD pain and back pain x 1 wk, has a michael leg bag draining approx. 100cc urine, pt going to MRI stated she can't go in W/C do to back pain, she ridder go in the stretcher. pt left with stretcher to MRI.      MRI called stated the pt is standing up next to the table and stated that she can't lie down on the MRI table.   MRI will be cancelled.  Sofía Dooley RN
pt appears more comfortable, laying in bed at this time.

## 2024-06-04 NOTE — ED PROVIDER NOTE - IV ALTEPLASE EXCL REL HIDDEN
EMERGENCY DEPARTMENT HISTORY AND PHYSICAL EXAM      Please note that this dictation was completed with [employer], the computer voice recognition software. Quite often unanticipated grammatical, syntax, homophones, and other interpretive errors are inadvertently transcribed by the computer software. Please disregard these errors. Please excuse any errors that have escaped final proofreading. Date: 10/14/2022  Patient Name: Eddie Jacome    History of Presenting Illness     Chief Complaint   Patient presents with    Ankle swelling     Patient reports swelling to L distal foot that started about two months ago intermittently, patient states it is affecting her sleep. Patient made an appointment with pediatrist in November. Pain is 5/10 at this time but increases to 10/10 intermittently. Patient denies injury. History Provided By: Patient    HPI: Eddie Jacome, 72 y.o. female with history of hypertension, diabetes and others presents ambulatory to the ED with cc of 2 months of intermittent pain to the left lateral foot that is worse with weightbearing. She denies any injury. She denies any history of gout, but tells me that several family members, to include her sister, has a history of gout. She has been well lately out fever. She denies any other joint pain. She denies throat pain, eye pain, chest pain, shortness of breath, abdominal pain, flank pain, skin rash or headache. He tells me she was able to schedule an appointment with a podiatrist however that is not until November 4. There are no other complaints, changes, or physical findings at this time. PCP: Aaron Moore MD    Current Outpatient Medications   Medication Sig Dispense Refill    predniSONE (STERAPRED DS) 10 mg dose pack Per Dose Pack instructions 21 Tablet 0    ibuprofen (MOTRIN) 600 mg tablet Take 1 Tablet by mouth every eight (8) hours as needed for Pain.  20 Tablet 0    chlorthalidone (HYGROTON) 25 mg tablet Take 1 Tablet by mouth daily. 90 Tablet 1    metFORMIN ER (GLUCOPHAGE XR) 500 mg tablet TAKE ONE TABLET BY MOUTH ONE TIME DAILY WITH DINNER 90 Tablet 1    lisinopriL (PRINIVIL, ZESTRIL) 40 mg tablet TAKE ONE-HALF TABLET BY MOUTH TWICE A DAY 30 Tablet 5    amLODIPine (NORVASC) 10 mg tablet Take 1 Tablet by mouth daily. 30 Tablet 5    Sutab 1.479-0.188- 0.225 gram tab  (Patient not taking: Reported on 7/14/2022)      cetirizine (ZYRTEC) 10 mg tablet Take 1 Tablet by mouth nightly. 90 Tablet 3    fluticasone propionate (FLONASE) 50 mcg/actuation nasal spray USE TWO SPRAYS IN EACH NOSTRIL ONE TIME DAILY 16 g 5    OTHER Dispense blood pressure monitoring kit to pt. Please ensure non-wrist cuff device, with appropriately sized cuff to allow for accurate home BP monitoring. (Patient not taking: Reported on 4/14/2022) 1 Each 0    Contour Next EZ Meter misc  (Patient not taking: Reported on 4/14/2022)      glucose blood VI test strips (ASCENSIA AUTODISC VI, ONE TOUCH ULTRA TEST VI) strip Test blood sugar 1-2 times daily. Dx:  DM type 2 with hyperglycemia. Dispense insurance-covered meter/strips. (Patient not taking: Reported on 4/14/2022) 100 Strip 5    lancets misc Test blood sugar 1-2 times daily. Dx:  DM type 2 with hyperglycemia. Dispense insurance-covered meter/strips. (Patient not taking: Reported on 4/14/2022) 100 Each 5    Blood-Glucose Meter monitoring kit Test blood sugar 1-2 times daily. Dx:  DM type 2 with hyperglycemia. Dispense insurance-covered meter/strips. (Patient not taking: Reported on 4/14/2022) 1 Kit 0    melatonin 5 mg cap capsule Take 1 Cap by mouth nightly. May increase to 10mg (2 capsules) in 5-7 days as needed for sleep. 50 Cap 1    multivitamin (ONE A DAY) tablet Take 1 Tab by mouth daily. OTHER Dispense blood pressure monitoring kit to pt. Please ensure non-wrist cuff device, with appropriately sized cuff to allow for accurate home BP monitoring.  (Patient not taking: Reported on 4/14/2022) 1 Each 0     Past History     Past Medical History:  Past Medical History:   Diagnosis Date    Diabetes ([address]     type 2    H/O seasonal allergies     Hepatic steatosis 2013    CT imaging. Hypertension     Menopause     Pap smear for cervical cancer screening 07/2016    Normal with negative HPV--repeat 2yr with gyn. Past Surgical History:  Past Surgical History:   Procedure Laterality Date    COLONOSCOPY N/A 6/9/2022    COLONOSCOPY performed by April Siu MD at [facility] ENDOSCOPY    COLONOSCOPY,JOSELITO Bill[place]  6/9/2022         HX BREAST BIOPSY Left 04/2022       Family History:  Family History   Problem Relation Age of Onset    Heart Disease Father     Hypertension Father     Glaucoma Father     Other Mother         ulcers       Social History:  Social History     Tobacco Use    Smoking status: Never    Smokeless tobacco: Never   Substance Use Topics    Alcohol use: Not Currently     Alcohol/week: 0.0 standard drinks     Comment: occasionally    Drug use: No       Allergies: Allergies   Allergen Reactions    Naprosyn [Naproxen] Other (comments)     Hallucinations. Tolerates Ibuprofen without problems. Review of Systems   Review of Systems   Constitutional:  Negative for fever. HENT:  Negative for sore throat. Eyes:  Negative for pain. Respiratory:  Negative for shortness of breath. Cardiovascular:  Negative for chest pain. Gastrointestinal:  Negative for abdominal pain. Genitourinary:  Negative for flank pain. Musculoskeletal:         Left foot pain   Skin:  Negative for rash. Neurological:  Negative for headaches. Physical Exam   Physical Exam  Vitals and nursing note reviewed. Constitutional:       General: She is not in acute distress. Appearance: She is well-developed. HENT:      Head: Normocephalic and atraumatic.       Right Ear: External ear normal.      Left Ear: External ear normal.      Nose: Nose normal.      Mouth/Throat:      Mouth: Mucous membranes are moist. Eyes:      Conjunctiva/sclera: Conjunctivae normal.      Pupils: Pupils are equal, round, and reactive to light. Cardiovascular:      Rate and Rhythm: Normal rate and regular rhythm. Pulmonary:      Effort: Pulmonary effort is normal. No respiratory distress. Abdominal:      Palpations: Abdomen is soft. Tenderness: There is no abdominal tenderness. Musculoskeletal:         General: Normal range of motion. Cervical back: Full passive range of motion without pain and normal range of motion. Left foot: Tenderness present. Feet:       Comments:   LEFT FOOT:  Mild redness and swelling over the base of the fifth metatarsal  No break in the skin  Redness and swelling are well localized  Mild tenderness over the base of the fifth metatarsal  There is otherwise no swelling of the foot  There is no lower extremity edema  No posterior ankle pain  Gold squeeze elicits plantar flexion   Skin:     Findings: No rash. Neurological:      Mental Status: She is alert and oriented to person, place, and time. She is not disoriented. GCS: GCS eye subscore is 4. GCS verbal subscore is 5. GCS motor subscore is 6. Cranial Nerves: No cranial nerve deficit. Psychiatric:         Speech: Speech normal.     Diagnostic Study Results     Labs -   No results found for this or any previous visit (from the past 12 hour(s)). Radiologic Studies -   XR FOOT LT MIN 3 V   Final Result   Soft tissue swelling fifth MTP joint without acute underlying   osseous abnormality. CT Results  (Last 48 hours)      None          CXR Results  (Last 48 hours)      None          Medical Decision Making   I am the first provider for this patient. I reviewed the vital signs, available nursing notes, past medical history, past surgical history, family history and social history. Vital Signs-Reviewed the patient's vital signs.   Patient Vitals for the past 12 hrs:   Temp Pulse Resp BP SpO2   10/14/22 2156 -- -- -- -- 98 %   10/14/22 2056 98.3 °F (36.8 °C) 75 14 (!) 163/76 98 %       Pulse Oximetry Analysis - 98% on RA    Records Reviewed: Nursing Notes and Old Medical Records    Provider Notes (Medical Decision Making):   DDx: Fracture, sprain, arthritis; presentation not consistent with cellulitis    Plain films demonstrate mild swelling without acute process. I will treat with pain medication and a course of oral steroids. Refer to podiatry. Return precautions for worsening symptoms or any concerns. ED Course:   Initial assessment performed. The patients presenting problems have been discussed, and they are in agreement with the care plan formulated and outlined with them. I have encouraged them to ask questions as they arise throughout their visit. Disposition:  Discharge    PLAN:  1. Current Discharge Medication List        START taking these medications    Details   predniSONE (STERAPRED DS) 10 mg dose pack Per Dose Pack instructions  Qty: 21 Tablet, Refills: 0  Start date: 10/14/2022      ibuprofen (MOTRIN) 600 mg tablet Take 1 Tablet by mouth every eight (8) hours as needed for Pain. Qty: 20 Tablet, Refills: 0  Start date: 10/14/2022           2. Follow-up Information       Follow up With Specialties Details Why Contact Info    Zaheer Noel MD Orthopedic Surgery Call  ORTHO: as needed [address]  [address]  [phone]            Return to ED if worse     Diagnosis     Clinical Impression:   1.  Acute pain of left foot show

## 2024-06-04 NOTE — ED PROVIDER NOTE - OBJECTIVE STATEMENT
39yo  ~6wks preg complicated by urinary retention s/p michael & pressary placement presents for continued lower pelvic/back pain. Seen multiple times in last week for same sxs, was in CDU in late May for the same without relief. Was seen yesterday for the same, dc with indomethacin and percocet. This AM woke up with pain, which came in 'waves' of deep aching cramping. No fevers, CP, SOB, vaginal discharge. Feels like she might not be draining urine completely, wonders if michael is out of place though has good urine flow in bag. TVUS US showed ~6wk preg in place w/ detectable FHR.

## 2024-06-04 NOTE — ED CDU PROVIDER INITIAL DAY NOTE - OBJECTIVE STATEMENT
37yo  ~6wks preg complicated by urinary retention s/p michael & pressary placement presents for continued lower pelvic/back pain. Seen multiple times in last week for same sxs, was in CDU in late May for the same without relief. Was seen yesterday for the same, dc with indomethacin and percocet. This AM woke up with pain, which came in 'waves' of deep aching cramping. No fevers, CP, SOB, vaginal discharge. Feels like she might not be draining urine completely, wonders if michael is out of place though has good urine flow in bag. TVUS US showed ~6wk preg in place w/ detectable FHR.    CDU PA: Agree with above. Ed team did bedside US and did not find pt to be retaining urine. Pt plan for CDU is pain control as needed and MR abdomen/pelvis non con r/o appenditis. Pt has not established care with OB-GYN.

## 2024-06-04 NOTE — ED CDU PROVIDER INITIAL DAY NOTE - CLINICAL SUMMARY MEDICAL DECISION MAKING FREE TEXT BOX
39yo  ~6wks preg complicated by urinary retention s/p michael & pressary placement presents for continued lower pelvic/back pain. Seen multiple times in last week for same sxs, was in CDU in late May for the same without relief. Was seen yesterday for the same, dc with indomethacin and percocet. This AM woke up with pain, which came in 'waves' of deep aching cramping. No fevers, CP, SOB, vaginal discharge. Feels like she might not be draining urine completely, wonders if michael is out of place though has good urine flow in bag. TVUS US showed ~6wk preg in place w/ detectable FHR.    CDU PA: Agree with above. Ed team did bedside US and did not find pt to be retaining urine. Pt plan for CDU is pain control as needed and MR abdomen/pelvis non con r/o appenditis. Pt has not established care with OB-GYN.

## 2024-06-04 NOTE — ED PROVIDER NOTE - ATTENDING CONTRIBUTION TO CARE
37 y/o  LMP  (5w5d)  who has a hx of severely  fibroid uterus c/b urinary retention and severe abdominal/back pain in early pregnancy. Patient has had muliple visits for same and has had prior CDU stay for pain management. Today patient PTED with persistent abdominal/back pain after recent d/c this afternoon for same. At that time Patimaria doloresn ws evaluated had a sono that showed  a SLIUP and large fibroids. Pt was d/tre with Cordero catheter in place and pessary placed by Urogyn after patient presumable had improvement of pain after Toradol.   Pt was d/tre with a short course of  Percocet  Indocin  and Follow up with Urogyn for management of pessary and plan to facilitate PCP/prenatal care (Dr. Mcdermott). Pt returns today for recurrence of pain    VSS  PE as above  labs from yesterday reviewed  Plan   Patient current pain status will probably require repeat admission to CDU will probably need MRI at this juncture to r/o atypical causes of pain such as subacute appendicitis etc  will provide analgesics reach out to gyn and repeat labs

## 2024-06-04 NOTE — ED PROVIDER NOTE - PHYSICAL EXAMINATION
CONSTITUTIONAL: uncomfortable fidgeting, tired appearing, non-toxic   SKIN: Warm dry  HEAD: NCAT  EYES: NL inspection  ENT: mildly dry oral mucosa  CARD: RRR  RESP: CTAB  ABD: generally discomforted, non-distended; michael appears in place with yellow urine in bag  EXT: no LE edema  NEURO: Grossly non-focal   PSYCH: Cooperative, appropriate.

## 2024-06-04 NOTE — CONSULT NOTE ADULT - ASSESSMENT
INCOMPLETE NOTE 38 yof  @ 5w6d by LMP  who presents to the ED with persistent abdominal pain. Was unable to take Indocin at home and came to the ED for pain control. MR ordered to rule out appendicitis; however, imaging was unable to be completed. Patient does not seem to have a surgically acute abdomen at this time.     Recommendations:  - continue pain control with PO Tylenol, Indocin 25mg q6h x24 hours, and Oxycodone  - Follow up with Urogyn for management of pessary  - Follow up with Urology for management of Cordero catheter  - go to outpatient appointment this Thursday to establish prenatal care      Patient seen with Dr. Mushtaq Sparrow, PGY2    38 yof  @ 5w6d by LMP  who presents to the ED with persistent abdominal pain. Was unable to take Indocin at home and came to the ED for pain control. MR ordered to rule out appendicitis; however, imaging was unable to be completed. Patient does not seem to have a surgically acute abdomen at this time. Vaginal bleeding is minimal. Possible threatened .    Recommendations:  - continue pain control with PO Tylenol, Indocin 25mg q6h x24 hours, and Oxycodone  - Follow up with Urogyn for management of pessary  - Follow up with Urology for management of Cordero catheter  - go to outpatient appointment this Thursday to establish prenatal care  - regarding vaginal bleeding, no acute interventions at this time; please re-consult if actively hemorrhaging or unstable      Patient seen with Dr. Mushtaq Sparrow, PGY2

## 2024-06-04 NOTE — ED ADULT NURSE NOTE - OBJECTIVE STATEMENT
pt received to room 1, A&Ox4 ambulatory c/o traumatic back pain. pt pacing in room unable to find comfortable position. pt approx 5 weeks pregnant. arrives with michael in place. 22G IV placed left AC.

## 2024-06-04 NOTE — CONSULT NOTE ADULT - ATTENDING COMMENTS
Pt seen and examined with PGY-2.  Agree with above history and plan.  At time of evaluation pt states pain was tolerable.  Pt believes 4mg of Morphine is "too much" for her.  Pt agrees to continue to take oxycodone, Tylenol, and indomethacin for fibroid pain.  Pt went to MRI but states she felt nauseous - pt states very few images were obtained.  MRI was ordered to r/o appendicitis.  On exam uterus ~ 1 cm below umbilicus.  Abdomen nontender, no rebound, or guarding.  Dark blood noted on underwear minimal dark blood noted in vagina.  Pessary in black.  Pt provided with pad and advised to monitor bleeding.  Rh+.  Continue current pain regimen, pad counts, michael to remain in place.  No acute GYN intervention at this time.      Kiana Landin MD Pt seen and examined with PGY-2.  Agree with above history and plan.  At time of evaluation pt states pain was tolerable.  Pt believes 4mg of Morphine is "too much" for her.  Pt agrees to continue to take oxycodone, Tylenol, and indomethacin for fibroid pain.  Pt went to MRI but states she felt nauseous - pt states very few images were obtained.  MRI was ordered to r/o appendicitis.  On exam uterus ~ 1 cm below umbilicus.  Abdomen nontender, no rebound, or guarding.  Dark blood noted on underwear minimal dark blood noted in vagina.  Pessary in black.  Pt provided with pad and advised to monitor bleeding.  Rh+.  Continue current pain regimen, pad counts, michael to remain in place.  No acute GYN intervention at this time.  Pt states her OB appointment was moved up to this Thursday 6/6.        Kiana Landin MD

## 2024-06-04 NOTE — CONSULT NOTE ADULT - SUBJECTIVE AND OBJECTIVE BOX
GYN Consult Note    38y G_P_  LMP * presents with   HPI:      OB/GYN HISTORY:   Physician:   Ob:   - G1:   - G2:   Gyn: Denies fibroids, cysts, PCOS, endometriosis, or history of genital lesions   PMH: Denies  PAST MEDICAL & SURGICAL HISTORY:  No pertinent past medical history        PSH: Denies   Meds:  MEDICATIONS  (STANDING):  sodium chloride 0.9%. 1000 milliLiter(s) (125 mL/Hr) IV Continuous <Continuous>    MEDICATIONS  (PRN):  acetaminophen     Tablet .. 975 milliGRAM(s) Oral every 6 hours PRN Mild Pain (1 - 3)  ondansetron Injectable 4 milliGRAM(s) IV Push every 6 hours PRN Nausea and/or Vomiting  oxyCODONE    IR 5 milliGRAM(s) Oral every 6 hours PRN Severe Pain (7 - 10)    Allergies:   Allergies    No Known Allergies    Intolerances          REVIEW OF SYSTEMS  General: denies fevers, chills, tiredness  Skin/Breast: denies breast pain  Respiratory and Thorax: denies shortness of breath or cough  Cardiovascular: denies chest pain, palpitations  Gastrointestinal: denies abdominal pain, nausea/ vomiting	  Genitourinary: denies dysuria, increased urinary frequency, urgency, abnormal vaginal discharge,   Constitutional, Cardiovascular, Respiratory, Gastrointestinal, Genitourinary, Musculoskeletal and Integumentary review of systems are normal except as noted. 	      Vital Signs Last 24 Hrs  T(C): 36.5 (2024 14:14), Max: 36.8 (2024 03:45)  T(F): 97.7 (2024 14:14), Max: 98.2 (2024 03:45)  HR: 93 (2024 17:06) (72 - 93)  BP: 123/74 (2024 17:06) (98/64 - 123/74)  BP(mean): --  RR: 18 (2024 17:06) (16 - 20)  SpO2: 95% (2024 17:06) (95% - 100%)    Parameters below as of 2024 17:06  Patient On (Oxygen Delivery Method): room air        PHYSICAL EXAM: Chaperoned w/ RN at bedside.   Gen: NAD, alert and oriented x 3  Cardiovascular: regular   Respiratory: breathing comfortably on RA  Abd: soft, non tender, non-distended  Pelvic: closed/long, no CMT, Uterus: normal size, non tender  Adnexa: non tender, no palpable masses  Extremities: NTBL  Skin: warm and well perfused      LABS:                        11.1   10.74 )-----------( 237      ( 2024 05:00 )             33.0     06-04    138  |  104  |  10  ----------------------------<  112<H>  4.1   |  22  |  0.79    Ca    9.3      2024 05:00    TPro  6.6  /  Alb  3.8  /  TBili  <0.2  /  DBili  x   /  AST  11  /  ALT  6   /  AlkPhos  57  06-04      Urinalysis Basic - ( 2024 06:00 )    Color: Yellow / Appearance: Turbid / S.021 / pH: x  Gluc: x / Ketone: Negative mg/dL  / Bili: Negative / Urobili: 0.2 mg/dL   Blood: x / Protein: Trace mg/dL / Nitrite: Negative   Leuk Esterase: Negative / RBC: 23 /HPF / WBC 6 /HPF   Sq Epi: x / Non Sq Epi: 3 /HPF / Bacteria: Negative /HPF        RADIOLOGY & ADDITIONAL STUDIES:   GYN Consult Note    38y  @ 5w6d by LMP  presenting to the ED due to persistent abdominal/back pain. Pt s/p multiple ED visits i/s/o PUL with fibroid uterus c/b urinary retention and severe abdominal/back pain. Pt was seen on  in the ED and appointment was made with Urogynecology on  where pt had pessary replaced, which relieved urinary obstruction.   Patient was seen and evaluated by GYN yesterday. At that time, GYN team recommended Indocin and Percocet vs Oxycodone.     Patient seen in the ED after returning from MRI. She had nausea/vomiting and was unable to complete imaging. She states pain is improved since initially coming to the ED. Also reports vaginal spotting. Has pessary in place.     She is scheduled to see Dr. Mcdermott this Thursday (24)      Name of GYN Physician: Urogyn (Dr. Garcia); has initial prenatal appt with Dr. Mcdermott  OBHx: x1 eTOP  GynHx: +newly diagnosed fibroids. Denies cysts, endometriosis, STIs, Abnormal pap smears   PMH: Denies  PSH: Denies  Meds: Denies  Allx: NKDA  Social History:  Denies smoking use, drug use, alcohol use.       Vital Signs Last 24 Hrs  T(C): 36.5 (2024 14:14), Max: 36.8 (2024 03:45)  T(F): 97.7 (2024 14:14), Max: 98.2 (2024 03:45)  HR: 93 (2024 17:06) (72 - 93)  BP: 123/74 (2024 17:06) (98/64 - 123/74)  BP(mean): --  RR: 18 (2024 17:06) (16 - 20)  SpO2: 95% (2024 17:06) (95% - 100%)    Parameters below as of 2024 17:06  Patient On (Oxygen Delivery Method): room air        PHYSICAL EXAM: Chaperoned w/ Dr. Landin at bedside.   Gen: NAD, alert and oriented x 3  Cardiovascular: regular   Respiratory: breathing comfortably on RA  Abd: soft, nontender to palpation, no guarding or rebound, uterine fundus palpated 1cm below umbilicus  : minimal vaginal spotting, dark-red blood  Extremities: NTBL  Skin: warm and well perfused      LABS:                        11.1   10.74 )-----------( 237      ( 2024 05:00 )             33.0     06-04    138  |  104  |  10  ----------------------------<  112<H>  4.1   |  22  |  0.79    Ca    9.3      2024 05:00    TPro  6.6  /  Alb  3.8  /  TBili  <0.2  /  DBili  x   /  AST  11  /  ALT  6   /  AlkPhos  57  06-04      Urinalysis Basic - ( 2024 06:00 )    Color: Yellow / Appearance: Turbid / S.021 / pH: x  Gluc: x / Ketone: Negative mg/dL  / Bili: Negative / Urobili: 0.2 mg/dL   Blood: x / Protein: Trace mg/dL / Nitrite: Negative   Leuk Esterase: Negative / RBC: 23 /HPF / WBC 6 /HPF   Sq Epi: x / Non Sq Epi: 3 /HPF / Bacteria: Negative /HPF        RADIOLOGY & ADDITIONAL STUDIES:  < from: MR Abdomen No Cont (24 @ 18:29) >  PROCEDURE:  MRI of the abdomen was attempted though prematurely terminated due to   patient's symptoms.    FINDINGS:  Significantly limited evaluation due to premature termination of the   study and motion artifact on the provided sequences. Only axial T1 and T2   fat-sat images were obtained.    There is tubular susceptibility artifact within the right lower quadrant   near the cecum that may represent a normal gas-filled appendix. There is   a small to moderate amount of fluid throughout the visualized abdomen,   right greater than left.    Mild right hydronephrosis with ureteral caliber change is a courses along   a large uterine fibroid. No left hydronephrosis. There is probable sludge   within the gallbladder.    Partial visualization of a large uterine fibroid and suboptimally   assessed fluid collection within the endometrial canal. A pessary device   is present. Probable 1.9 cm right corpus luteal cyst with right ovarian   edema.    The urinary bladder is decompressed with a Cordero catheter in place.    IMPRESSION:  Incomplete examination due to patient's symptoms, limiting evaluation.    Within these limitations, there appears to be a tubular area   susceptibility artifact within the right lower quadrant, suggestive of a   normal gas-filled appendix.    Small amount amount of fluid within the abdomen and pelvis, right greater   than left, concern etiology. This could be due to cyst rupture given the   presence of a 1.9 cm right corpus luteal cyst. The right ovary also   appears to be edematous.    Mild right hydronephrosis with caliber change at the mid right ureter as  it courses posterior to a large uterine fibroid.        --- End of Report ---    < end of copied text >

## 2024-06-04 NOTE — ED CDU PROVIDER INITIAL DAY NOTE - PROGRESS NOTE DETAILS
The patient initially did not want to come to the CDU because she did not want to lay flat for an MRI. However, she became agreeable after receiving pain medication. While in the CDU, the patient experienced pain multiple times throughout the visit. In the ER, the patient was given oxycodone which helped alleviate the pain. Therefore, an order for oxycodone was placed while in the CDU. However, when the patient was brought to the CDU, she was experiencing nausea and one episode of non-bilious, non-bloody (NBNB) emesis, as well as persistent nausea. As a result, the order for oxycodone was changed to morphine.    I contacted the OBGYN resident to confirm, and they approved the switch to morphine. Upon reassessment, the patient reported that the morphine was not helping with the pain. After a lengthy discussion, I advised the patient that since she was no longer experiencing vomiting, she should attempt the oxycodone as it had initially helped her in the ER. The patient agreed to try the oxycodone, but the RN made multiple attempts to administer the medication for pain, which the patient declined.    The patient also frequently asked me to call the GYN so she could speak with them again. I paged the GYN, who informed me that they were consulting on other cases but would come speak to the patient when possible - I relied this information to the pt. Additionally, I made multiple calls to MRI to expedite the patient's study as MRI was indicated for appendicitis. However, the first time transport came to take the patient, she declined due to pain. After discussing with the patient, she agreed to go to MRI if she was given morphine prior to the study. A 4 mg morphine dose was agreed upon to help her tolerate the study, and I was able to expedite the process with MRI.    After the patient went upstairs for the study, the MRI tech called to hold off on the MRI until they could confirm the patient's pessary model and make. I spoke with the MRI tech and reassured him that the pessaries used in the uro-GYN clinic are silicone rings with no metal, and therefore, there would be no contraindications. I confirmed the GYN resident that they do not use any metal parts for pessaries. The patient finally underwent the study but became nauseous and started vomiting, which resulted in incomplete imaging. The patient returned from the MRI around 6:20 PM, appearing uncomfortable but without further vomiting. She pointed at the wheelchair, indicating she needed assistance, and when I approached, I noticed I had maroon-colored discharge on the blanket. I asked the patient if she was experiencing any vaginal bleeding or spotting, but she only mentioned her pain and requested her dose of acetaminophen and Zofran. Once medicated, I reassessed the patient and suggested an exam to assess the source of the bleeding. The patient immediately started pulling down her underwear with her father in the room, and I refrained from the exam as I did not have a chaperone and did not consider it appropriate especially as her father had also stepped out of the room immediately.    The GYN consult came to assess the patient and discussed monitoring for worsening bleeding while continuing with MRI results and pain control as there is only scant spotting. Additionally, it was discovered that the patient had taken Toradol from another source for pain control outside the hospital. Later, the night PA and I began to greet each other, but the patient opened her curtains and stated that I was unprofessional, and that her cousin who was with her in the morning also felt the same way. I approached the patient to address her concerns and apologize for any possible unprofessional behavior, but she could not provide any specific examples for me to apologize for - I apologized that she was not feeling cared for but attempted to reassure her that I have been attempting to complete her medical care plan/medications. She asked for my name, which I provided to the once again.

## 2024-06-05 ENCOUNTER — NON-APPOINTMENT (OUTPATIENT)
Age: 39
End: 2024-06-05

## 2024-06-05 VITALS
HEART RATE: 65 BPM | TEMPERATURE: 98 F | RESPIRATION RATE: 16 BRPM | SYSTOLIC BLOOD PRESSURE: 102 MMHG | OXYGEN SATURATION: 98 % | DIASTOLIC BLOOD PRESSURE: 63 MMHG

## 2024-06-05 LAB
CULTURE RESULTS: NO GROWTH — SIGNIFICANT CHANGE UP
SPECIMEN SOURCE: SIGNIFICANT CHANGE UP

## 2024-06-05 PROCEDURE — 99239 HOSP IP/OBS DSCHRG MGMT >30: CPT

## 2024-06-05 RX ORDER — OXYCODONE HYDROCHLORIDE 5 MG/1
5 TABLET ORAL ONCE
Refills: 0 | Status: DISCONTINUED | OUTPATIENT
Start: 2024-06-05 | End: 2024-06-05

## 2024-06-05 RX ADMIN — Medication 975 MILLIGRAM(S): at 01:23

## 2024-06-05 RX ADMIN — SODIUM CHLORIDE 125 MILLILITER(S): 9 INJECTION INTRAMUSCULAR; INTRAVENOUS; SUBCUTANEOUS at 07:23

## 2024-06-05 RX ADMIN — OXYCODONE HYDROCHLORIDE 5 MILLIGRAM(S): 5 TABLET ORAL at 02:50

## 2024-06-05 RX ADMIN — Medication 975 MILLIGRAM(S): at 02:14

## 2024-06-05 RX ADMIN — OXYCODONE HYDROCHLORIDE 5 MILLIGRAM(S): 5 TABLET ORAL at 02:19

## 2024-06-05 NOTE — ED CDU PROVIDER DISPOSITION NOTE - PATIENT PORTAL LINK FT
You can access the FollowMyHealth Patient Portal offered by [facility] by registering at the following website: [email or web address]. By joining [facility]’s FollowMyHealth portal, you will also be able to view your health information using other applications (apps) compatible with our system.

## 2024-06-05 NOTE — ED CDU PROVIDER DISPOSITION NOTE - CARE PROVIDER_API CALL
Bell Mcdermott  Obstetrics and Gynecology  [address] [place] [address], [address]  [place] [address]  Phone: [phone]  Fax: [phone]  Follow Up Time:

## 2024-06-05 NOTE — ED CDU PROVIDER SUBSEQUENT DAY NOTE - ATTENDING APP SHARED VISIT CONTRIBUTION OF CARE
Attending Statement: I have reviewed and agree with all pertinent clinical information, including history and physical exam and agree with treatment plan of the PA, except as noted.  agree w above  No events overnight.  Patient required 1 dose of oxycodone.  No fever no chills.  Has been ambulatory.  Cordero draining.  No further vaginal bleeding.  Was evaluated by GYN this morning.  Tolerated.  Diet.  No acute intervention from GYN.  Vital signs noted.  Patient ANO x 3 ambulatory no distress.  Talking in full clear sentences.  Abdomen soft no focal tenderness.  Cordero in place with yellow urine. Examined w MORGAN Quintero at bedside.   May discharge home per their recommendations on oxycodone for severe pain.  Follow-up with appointment on Thursday with Dr Mcdermott to establish medical care.  Patient ANO x 3 feels comfortable with plan.  Will call her family to pick her up.  To return precautions given.

## 2024-06-05 NOTE — ED CDU PROVIDER SUBSEQUENT DAY NOTE - CLINICAL SUMMARY MEDICAL DECISION MAKING FREE TEXT BOX
37yo  ~6wks preg complicated by urinary retention s/p michael & pressary placement presents for continued lower pelvic/back pain. ED team did bedside US and did not find pt to be retaining urine. Pt plan for CDU is pain control as needed and MR abdomen/pelvis non con r/o appenditis. MRI was read a possible rupturing ovarian cyst on the right side, but no signs of appendicitis. Patient also began to have some mild vaginal bleeding in CDU so ob/gyn was consulted and they said nothing to do at this time. Patient still pending continued monitoring for pain control with reassessment in the am.

## 2024-06-05 NOTE — ED CDU PROVIDER DISPOSITION NOTE - CLINICAL COURSE
39yo  ~6wks preg complicated by urinary retention s/p michael & pressary placement presents for continued lower pelvic/back pain. Seen multiple times in last week for same sxs, was in CDU in late May for the same without relief. Was seen yesterday for the same, dc with indomethacin and percocet. This AM woke up with pain, which came in 'waves' of deep aching cramping. Returned for worsening pain. TVUS US showed ~6wk preg in place w/ detectable . ED team did bedside US and did not find pt to be retaining urine. CDU treatment and plan: pain control as needed and MR abdomen/pelvis non con r/o appendicitis. MRI impression: Incomplete examination due to patient's symptoms, limiting evaluation. Within these limitations, there appears to be a tubular area susceptibility artifact within the right lower quadrant, suggestive of a normal gas-filled appendix. Small amount of fluid within the abdomen and pelvis, right greater than left, concern etiology. This could be due to cyst rupture given the presence of a 1.9 cm right corpus luteal cyst. The right ovary also appears to be edematous. Mild right hydronephrosis with caliber change at the mid right ureter as it courses posterior to a large uterine fibroid. patient had bleeding vaginally yesterday that resolved today. OBGYN saw patient this morning cleared from their standpoint for dc. recs, Follow up with Urogyn for management of pessary, Follow up with Urology for management of Michael catheter, go to outpatient appointment this Thursday with Dr. POORNIMA Mcdermott ([facility]) to establish prenatal care. Michael is draining yellow urine, pain improved, clinically NT, and well appearing, vitally stable for dc. Patient amendable to plan of dc, has medication sent to pharmacy.

## 2024-06-05 NOTE — CHART NOTE - NSCHARTNOTEFT_GEN_A_CORE
R4 GYN Progress Note    Patient seen and examined at bedside.  No acute events overnight. No acute complaints.  Pain well controlled. Patient is ambulating and tolerating PO. Cordero is still in place. Denies CP, SOB, N/V, fevers, and chills.    Vital Signs Last 24 Hours  T(C): 36.8 (24 @ 06:06), Max: 36.8 (24 @ 02:00)  HR: 83 (24 @ 06:06) (61 - 93)  BP: 100/60 (24 @ 06:06) (96/64 - 123/74)  RR: 16 (24 @ 06:06) (16 - 18)  SpO2: 97% (24 @ 06:06) (95% - 100%)      Physical Exam:  General: NAD  CV: RR, S1, S2, no M/R/G  Lungs: CTA b/l, good air flow b/l   Abdomen: Soft, non tender, normoactive bowel sounds  Ext: No pain or swelling     Labs:                        11.1   10.74 )-----------( 237      ( 2024 05:00 )             33.0   baso 0.3    eos 1.3    imm gran 0.4    lymph 23.1   mono 7.2    poly 67.7                         10.7   9.54  )-----------( 238      ( 2024 04:08 )             31.9   baso 0.3    eos 1.9    imm gran 0.3    lymph 23.6   mono 8.6    poly 65.3     MEDICATIONS  (STANDING):  sodium chloride 0.9%. 1000 milliLiter(s) (125 mL/Hr) IV Continuous <Continuous>    MEDICATIONS  (PRN):  acetaminophen     Tablet .. 975 milliGRAM(s) Oral every 6 hours PRN Mild Pain (1 - 3)  ondansetron Injectable 4 milliGRAM(s) IV Push every 6 hours PRN Nausea and/or Vomiting  oxyCODONE    IR 5 milliGRAM(s) Oral every 6 hours PRN Severe Pain (7 - 10)      A/P: 39yo  @6w0d by LMP  who presented to the ED with abdominal pain. Patient is stable and doing well.    Neuro: PO pain meds   CV: Hemodynamically stable  Pulm: Saturating well on room air, encourage oob/amb  GI: Advance to regular diet vs. Continue regular diet  : UOP adequate, Cordero in place  - regarding vaginal bleeding, no acute interventions at this time; please re-consult if actively hemorrhaging or unstable  ID: Afebrile  Endo: No active issues   Dispo: Discharge home today  - Follow up with Urogyn for management of pessary  - Follow up with Urology for management of Cordero catheter  - go to outpatient appointment this Thursday with Dr. POORNIMA Mcdermott ([facility]) to establish prenatal care    BASIL Fajardo, PGY4

## 2024-06-05 NOTE — ED CDU PROVIDER DISPOSITION NOTE - NSFOLLOWUPINSTRUCTIONS_ED_ALL_ED_FT
Follow up with Urogyn for management of pessary.    Follow up with Urology for management of Cordero catheter.    Go to outpatient appointment this Thursday with Dr. POORNIMA Mcdermott ([facility]) to establish prenatal care.    Follow up with your Primary Medical Doctor within 2-3days. If results or reports were given to you, show copies of your reports given to you. Take all of your medications as previously prescribed.     Take medications as prescribed previously.       Abdominal Pain During Pregnancy  Abdominal pain is common during pregnancy and has many possible causes. Some causes are more serious than others, and sometimes the cause is not known.    Abdominal pain can be a sign that labor is starting. It can also be caused by normal growth of your baby causing stretching of muscles and ligaments during pregnancy. Always tell your health care provider if you have any abdominal pain.    Follow these instructions at home:    Do not have sex or put anything in your vagina until your pain goes away completely.  Get plenty of rest until your pain improves.  Drink enough fluid to keep your urine pale yellow.  Take over-the-counter and prescription medicines only as told by your health care provider.  Keep all follow-up visits. This is important.  Contact a health care provider if:  Your pain continues or gets worse after resting.  You have lower abdominal pain that:  Comes and goes at regular intervals.  Spreads to your back.  Is similar to menstrual cramps.  You have pain or burning when you urinate.  Get help right away if:  You have a fever, chills, or shortness of breath.  You have vaginal bleeding.  You are leaking fluid or passing tissue from your vagina.  You have vomiting or diarrhea that lasts for more than 24 hours.  Your baby is moving less than usual.  You feel very weak or faint.  You develop severe pain in your upper abdomen.  Summary  Abdominal pain is common during pregnancy and has many possible causes.  If you experience abdominal pain during pregnancy, tell your health care provider right away.  Follow your health care provider's home care instructions and keep all follow-up visits as told.  This information is not intended to replace advice given to you by your health care provider. Make sure you discuss any questions you have with your health care provider.

## 2024-06-05 NOTE — ED CDU PROVIDER SUBSEQUENT DAY NOTE - HISTORY
39yo  ~6wks preg complicated by urinary retention s/p michael & pressary placement presents for continued lower pelvic/back pain. Seen multiple times in last week for same sxs, was in CDU in late May for the same without relief. Was seen yesterday for the same, dc with indomethacin and percocet. This AM woke up with pain, which came in 'waves' of deep aching cramping. No fevers, CP, SOB, vaginal discharge. Feels like she might not be draining urine completely, wonders if michael is out of place though has good urine flow in bag. TVUS US showed ~6wk preg in place w/ detectable FHR.  Ed team did bedside US and did not find pt to be retaining urine. Pt plan for CDU is pain control as needed and MR abdomen/pelvis non con r/o appenditis. Pt has not established care with OB-GYN.    Interval history: Patient continues to rest comfortably in CDU. Vitals have been stable throughout CDU stay. Patient has no new complaints or concerns upon re-evaluation. MRI was attempted but unable to be completed fully due to patient inability to tolerate the exam. MRI was read a possible rupturing ovarian cyst on the right side, but no signs of appendicitis. Patient also began to have some mild vaginal bleeding in CDU so ob/gyn was consulted and they said nothing to do at this time. Patient still pending continued monitoring for pain control with reassessment in the am.

## 2024-06-06 ENCOUNTER — NON-APPOINTMENT (OUTPATIENT)
Age: 39
End: 2024-06-06

## 2024-06-06 ENCOUNTER — APPOINTMENT (OUTPATIENT)
Dept: OBGYN | Facility: CLINIC | Age: 39
End: 2024-06-06
Payer: COMMERCIAL

## 2024-06-06 VITALS
BODY MASS INDEX: 24.32 KG/M2 | HEART RATE: 90 BPM | WEIGHT: 146 LBS | HEIGHT: 65 IN | DIASTOLIC BLOOD PRESSURE: 72 MMHG | SYSTOLIC BLOOD PRESSURE: 115 MMHG

## 2024-06-06 PROCEDURE — 99213 OFFICE O/P EST LOW 20 MIN: CPT

## 2024-06-06 PROCEDURE — 0500F INITIAL PRENATAL CARE VISIT: CPT

## 2024-06-10 ENCOUNTER — OUTPATIENT (OUTPATIENT)
Dept: OUTPATIENT SERVICES | Facility: HOSPITAL | Age: 39
LOS: 1 days | End: 2024-06-10
Payer: COMMERCIAL

## 2024-06-10 ENCOUNTER — APPOINTMENT (OUTPATIENT)
Dept: UROGYNECOLOGY | Facility: CLINIC | Age: 39
End: 2024-06-10
Payer: COMMERCIAL

## 2024-06-10 VITALS
WEIGHT: 134.92 LBS | OXYGEN SATURATION: 99 % | TEMPERATURE: 98 F | RESPIRATION RATE: 16 BRPM | HEART RATE: 92 BPM | SYSTOLIC BLOOD PRESSURE: 114 MMHG | DIASTOLIC BLOOD PRESSURE: 76 MMHG | HEIGHT: 63 IN

## 2024-06-10 DIAGNOSIS — Z01.818 ENCOUNTER FOR OTHER PREPROCEDURAL EXAMINATION: ICD-10-CM

## 2024-06-10 DIAGNOSIS — Z34.90 ENCOUNTER FOR SUPERVISION OF NORMAL PREGNANCY, UNSPECIFIED, UNSPECIFIED TRIMESTER: ICD-10-CM

## 2024-06-10 DIAGNOSIS — O34.10 MATERNAL CARE FOR BENIGN TUMOR OF CORPUS UTERI, UNSPECIFIED TRIMESTER: ICD-10-CM

## 2024-06-10 DIAGNOSIS — D25.9 LEIOMYOMA OF UTERUS, UNSPECIFIED: ICD-10-CM

## 2024-06-10 LAB
ANION GAP SERPL CALC-SCNC: 13 MMOL/L — SIGNIFICANT CHANGE UP (ref 5–17)
BLD GP AB SCN SERPL QL: NEGATIVE — SIGNIFICANT CHANGE UP
BUN SERPL-MCNC: 6 MG/DL — LOW (ref 7–23)
CALCIUM SERPL-MCNC: 9.6 MG/DL — SIGNIFICANT CHANGE UP (ref 8.4–10.5)
CHLORIDE SERPL-SCNC: 101 MMOL/L — SIGNIFICANT CHANGE UP (ref 96–108)
CO2 SERPL-SCNC: 23 MMOL/L — SIGNIFICANT CHANGE UP (ref 22–31)
CREAT SERPL-MCNC: 0.69 MG/DL — SIGNIFICANT CHANGE UP (ref 0.5–1.3)
EGFR: 114 ML/MIN/1.73M2 — SIGNIFICANT CHANGE UP
GLUCOSE SERPL-MCNC: 88 MG/DL — SIGNIFICANT CHANGE UP (ref 70–99)
HCT VFR BLD CALC: 36.4 % — SIGNIFICANT CHANGE UP (ref 34.5–45)
HGB BLD-MCNC: 12 G/DL — SIGNIFICANT CHANGE UP (ref 11.5–15.5)
MCHC RBC-ENTMCNC: 27.2 PG — SIGNIFICANT CHANGE UP (ref 27–34)
MCHC RBC-ENTMCNC: 33 GM/DL — SIGNIFICANT CHANGE UP (ref 32–36)
MCV RBC AUTO: 82.5 FL — SIGNIFICANT CHANGE UP (ref 80–100)
NRBC # BLD: 0 /100 WBCS — SIGNIFICANT CHANGE UP (ref 0–0)
PLATELET # BLD AUTO: 299 K/UL — SIGNIFICANT CHANGE UP (ref 150–400)
POTASSIUM SERPL-MCNC: 3.8 MMOL/L — SIGNIFICANT CHANGE UP (ref 3.5–5.3)
POTASSIUM SERPL-SCNC: 3.8 MMOL/L — SIGNIFICANT CHANGE UP (ref 3.5–5.3)
RBC # BLD: 4.41 M/UL — SIGNIFICANT CHANGE UP (ref 3.8–5.2)
RBC # FLD: 14.3 % — SIGNIFICANT CHANGE UP (ref 10.3–14.5)
RH IG SCN BLD-IMP: POSITIVE — SIGNIFICANT CHANGE UP
SODIUM SERPL-SCNC: 137 MMOL/L — SIGNIFICANT CHANGE UP (ref 135–145)
WBC # BLD: 8.69 K/UL — SIGNIFICANT CHANGE UP (ref 3.8–10.5)
WBC # FLD AUTO: 8.69 K/UL — SIGNIFICANT CHANGE UP (ref 3.8–10.5)

## 2024-06-10 PROCEDURE — 80048 BASIC METABOLIC PNL TOTAL CA: CPT

## 2024-06-10 PROCEDURE — 99213 OFFICE O/P EST LOW 20 MIN: CPT

## 2024-06-10 PROCEDURE — G0463: CPT

## 2024-06-10 PROCEDURE — 85027 COMPLETE CBC AUTOMATED: CPT

## 2024-06-10 PROCEDURE — 86850 RBC ANTIBODY SCREEN: CPT

## 2024-06-10 PROCEDURE — 86900 BLOOD TYPING SEROLOGIC ABO: CPT

## 2024-06-10 PROCEDURE — 86901 BLOOD TYPING SEROLOGIC RH(D): CPT

## 2024-06-10 PROCEDURE — 36415 COLL VENOUS BLD VENIPUNCTURE: CPT

## 2024-06-10 NOTE — H&P PST ADULT - FUNCTIONAL STATUS
treadmill running 30 min & weight lifting 5 days weekly/4-10 METS treadmill running 30 min & weight lifting 5 days weekly, DASI 9.89/4-10 METS

## 2024-06-10 NOTE — PLAN
[FreeTextEntry1] : AARON FIELD is a 38 year old presenting for new patient. -discussed risks of uterine fibroids in pregnancy.  -discussed possible  contractions, MAB, fibroid degeneration,  labor if preg continues vs TOP and interval myomectomy.   -Rx for indomethacin  -per pt has f/u with Dr darby Monday   f/u next week telehealth

## 2024-06-10 NOTE — H&P PST ADULT - ATTENDING COMMENTS
pt seen and plan discussed. to proceed with scheduled procedure. all questions answered. informed consent signed and witnessed.     amparo reid

## 2024-06-10 NOTE — H&P PST ADULT - ASSESSMENT
Airway  Denies loose teeth or dentures   Airway  Mallampati III  Denies loose teeth or dentures    Activity Level  treadmill running 30 min & weight lifting 5 days weekly, DASI 9.89

## 2024-06-10 NOTE — END OF VISIT
[FreeTextEntry3] : I, Annamaria Chakraborty, acted solely as a scribe for Dr. Bell Mcdermott, on 06/06/2024.   All medical record entries made by the scribe were at my, Dr. Bell Mcdermott., direction and personally dictated by me on 06/06/2024. I have personally reviewed the chart and agree that the record accurately reflects my personal performance of the history, physical exam, assessment and plan.

## 2024-06-10 NOTE — HISTORY OF PRESENT ILLNESS
[FreeTextEntry1] : AARON FIELD is a 38 year old presenting for new patient for Uterine fibroid in pregnancy. Hx of fibroids. Pt went to the ED for abdominal pain initially, hx of painful periods and heavy bleeding, + HCG, desired pregnancy. Reports lower back pain, using heating pads, occasional lower abdominal pain. Took indomethacin in the ED.  Reports not having a bowel movement for about 3 weeks, pt took laxative to help with constipation but only went once. Still feeling bloating and constipation, some spotting. Pt has pessary placed. Pt had pelvic US done showing fibroids. Reports urine retention went to the ED and had catheter placed.

## 2024-06-10 NOTE — H&P PST ADULT - HISTORY OF PRESENT ILLNESS
38 year old female, approximately 6 weeks gestation, presents for D&C for IUP and possible laparoscopic myomectomy. Pt. reports experiencing abdominal and back pain for the past 3 weeks, evaluated in ER, + HCG and diagnostic imaging revealed uterine leiomyoma. Pt. also reports experiencing urinary retention, michael catheter currently in place and MRI revealed ruptured ovarian cyst.    Denies history of COVID infection.

## 2024-06-11 ENCOUNTER — APPOINTMENT (OUTPATIENT)
Dept: OBGYN | Facility: CLINIC | Age: 39
End: 2024-06-11
Payer: COMMERCIAL

## 2024-06-11 DIAGNOSIS — D25.9 MATERNAL CARE FOR BENIGN TUMOR OF CORPUS UTERI, UNSPECIFIED TRIMESTER: ICD-10-CM

## 2024-06-11 DIAGNOSIS — O34.10 MATERNAL CARE FOR BENIGN TUMOR OF CORPUS UTERI, UNSPECIFIED TRIMESTER: ICD-10-CM

## 2024-06-11 DIAGNOSIS — D25.9 LEIOMYOMA OF UTERUS, UNSPECIFIED: ICD-10-CM

## 2024-06-11 PROCEDURE — 99213 OFFICE O/P EST LOW 20 MIN: CPT

## 2024-06-11 NOTE — HISTORY OF PRESENT ILLNESS
[FreeTextEntry1] : pt presents for follow up of fibroid uterus in pregnancy. now s/p appointment for michael catheter removal with dr darby yesterday with plans for intermittent self-catheterization. pt was supposed to remove the michael herself last night but has not been able to secondary to severe pain. she has not been sleeping secondary to the pain. went to presurgical testing. accepts blood transfusion if necessary. on tuesday/thursday demi is in office.   the uterus is jazmyn strongly this morning. so painful that she cannot have a BM. had a BM the other day but was very painful.   plan: will send refill of indomethacin, restart indomethacin today with 50mg loading dose discussed indomethacin vs toradol for postop d&c pain control prioritize this pt and hold a main or date for myomectomy. to determine open vs laparoscopic worst case scenario will show up to surgery on thursday with a michael catheter

## 2024-06-12 ENCOUNTER — TRANSCRIPTION ENCOUNTER (OUTPATIENT)
Age: 39
End: 2024-06-12

## 2024-06-12 RX ORDER — INDOMETHACIN 25 MG/1
25 CAPSULE ORAL
Qty: 4 | Refills: 0 | Status: ACTIVE | COMMUNITY
Start: 2024-06-10 | End: 1900-01-01

## 2024-06-13 ENCOUNTER — APPOINTMENT (OUTPATIENT)
Dept: OBGYN | Facility: HOSPITAL | Age: 39
End: 2024-06-13

## 2024-06-13 ENCOUNTER — OUTPATIENT (OUTPATIENT)
Dept: OUTPATIENT SERVICES | Facility: HOSPITAL | Age: 39
LOS: 1 days | End: 2024-06-13
Payer: COMMERCIAL

## 2024-06-13 ENCOUNTER — TRANSCRIPTION ENCOUNTER (OUTPATIENT)
Age: 39
End: 2024-06-13

## 2024-06-13 ENCOUNTER — RESULT REVIEW (OUTPATIENT)
Age: 39
End: 2024-06-13

## 2024-06-13 VITALS
WEIGHT: 134.92 LBS | TEMPERATURE: 98 F | HEART RATE: 96 BPM | HEIGHT: 63.07 IN | OXYGEN SATURATION: 98 % | SYSTOLIC BLOOD PRESSURE: 117 MMHG | RESPIRATION RATE: 19 BRPM | DIASTOLIC BLOOD PRESSURE: 77 MMHG

## 2024-06-13 VITALS
TEMPERATURE: 98 F | OXYGEN SATURATION: 99 % | DIASTOLIC BLOOD PRESSURE: 74 MMHG | RESPIRATION RATE: 16 BRPM | HEART RATE: 89 BPM | SYSTOLIC BLOOD PRESSURE: 120 MMHG

## 2024-06-13 DIAGNOSIS — O34.10 MATERNAL CARE FOR BENIGN TUMOR OF CORPUS UTERI, UNSPECIFIED TRIMESTER: ICD-10-CM

## 2024-06-13 DIAGNOSIS — D25.9 LEIOMYOMA OF UTERUS, UNSPECIFIED: ICD-10-CM

## 2024-06-13 LAB — HCG UR QL: POSITIVE

## 2024-06-13 PROCEDURE — 59840 INDUCED ABORTION D&C: CPT

## 2024-06-13 PROCEDURE — C9399: CPT

## 2024-06-13 PROCEDURE — 88305 TISSUE EXAM BY PATHOLOGIST: CPT | Mod: 26

## 2024-06-13 PROCEDURE — 81025 URINE PREGNANCY TEST: CPT

## 2024-06-13 PROCEDURE — 76998 US GUIDE INTRAOP: CPT | Mod: 26

## 2024-06-13 PROCEDURE — 88305 TISSUE EXAM BY PATHOLOGIST: CPT

## 2024-06-13 PROCEDURE — 59820 CARE OF MISCARRIAGE: CPT

## 2024-06-13 RX ORDER — FENTANYL CITRATE 50 UG/ML
25 INJECTION INTRAVENOUS
Refills: 0 | Status: DISCONTINUED | OUTPATIENT
Start: 2024-06-13 | End: 2024-06-13

## 2024-06-13 RX ORDER — SODIUM CHLORIDE 9 MG/ML
3 INJECTION INTRAMUSCULAR; INTRAVENOUS; SUBCUTANEOUS EVERY 8 HOURS
Refills: 0 | Status: DISCONTINUED | OUTPATIENT
Start: 2024-06-13 | End: 2024-06-13

## 2024-06-13 RX ORDER — SODIUM CHLORIDE 9 MG/ML
1000 INJECTION, SOLUTION INTRAVENOUS
Refills: 0 | Status: DISCONTINUED | OUTPATIENT
Start: 2024-06-13 | End: 2024-06-13

## 2024-06-13 RX ORDER — KETOROLAC TROMETHAMINE 30 MG/ML
30 SYRINGE (ML) INJECTION ONCE
Refills: 0 | Status: DISCONTINUED | OUTPATIENT
Start: 2024-06-13 | End: 2024-06-13

## 2024-06-13 RX ORDER — KETOROLAC TROMETHAMINE 30 MG/ML
1 SYRINGE (ML) INJECTION
Qty: 16 | Refills: 0
Start: 2024-06-13

## 2024-06-13 RX ORDER — LIDOCAINE HCL 20 MG/ML
0.2 VIAL (ML) INJECTION ONCE
Refills: 0 | Status: ACTIVE | OUTPATIENT
Start: 2024-06-13 | End: 2024-06-13

## 2024-06-13 RX ORDER — ONDANSETRON 8 MG/1
4 TABLET, FILM COATED ORAL ONCE
Refills: 0 | Status: DISCONTINUED | OUTPATIENT
Start: 2024-06-13 | End: 2024-06-13

## 2024-06-13 RX ORDER — OXYCODONE HYDROCHLORIDE 5 MG/1
5 TABLET ORAL ONCE
Refills: 0 | Status: DISCONTINUED | OUTPATIENT
Start: 2024-06-13 | End: 2024-06-13

## 2024-06-13 RX ORDER — KETOROLAC TROMETHAMINE 30 MG/ML
1 INJECTION, SOLUTION INTRAMUSCULAR
Qty: 16 | Refills: 0 | DISCHARGE
Start: 2024-06-13

## 2024-06-13 RX ORDER — SODIUM CHLORIDE 9 MG/ML
1000 INJECTION, SOLUTION INTRAVENOUS
Refills: 0 | Status: ACTIVE | OUTPATIENT
Start: 2024-06-13 | End: 2025-05-12

## 2024-06-13 RX ORDER — IBUPROFEN 200 MG
1 TABLET ORAL
Qty: 0 | Refills: 0 | DISCHARGE

## 2024-06-13 RX ORDER — CEFAZOLIN SODIUM 1 G
2000 VIAL (EA) INJECTION ONCE
Refills: 0 | Status: COMPLETED | OUTPATIENT
Start: 2024-06-13 | End: 2024-06-13

## 2024-06-13 RX ORDER — FENTANYL CITRATE 50 UG/ML
50 INJECTION INTRAVENOUS
Refills: 0 | Status: DISCONTINUED | OUTPATIENT
Start: 2024-06-13 | End: 2024-06-13

## 2024-06-13 RX ORDER — CHLORHEXIDINE GLUCONATE 213 G/1000ML
1 SOLUTION TOPICAL ONCE
Refills: 0 | Status: ACTIVE | OUTPATIENT
Start: 2024-06-13 | End: 2024-06-13

## 2024-06-13 RX ADMIN — SODIUM CHLORIDE 125 MILLILITER(S): 9 INJECTION, SOLUTION INTRAVENOUS at 16:46

## 2024-06-13 RX ADMIN — FENTANYL CITRATE 50 MICROGRAM(S): 50 INJECTION INTRAVENOUS at 13:17

## 2024-06-13 RX ADMIN — OXYCODONE HYDROCHLORIDE 5 MILLIGRAM(S): 5 TABLET ORAL at 14:32

## 2024-06-13 RX ADMIN — Medication 30 MILLIGRAM(S): at 19:04

## 2024-06-13 RX ADMIN — FENTANYL CITRATE 50 MICROGRAM(S): 50 INJECTION INTRAVENOUS at 13:32

## 2024-06-13 NOTE — PRE-OP CHECKLIST - TEMPERATURE IN FAHRENHEIT (DEGREES F)
Patient on meropenem 500 mg Q12h for ESBL E.coli bacteremia. Today’s GFR = 77 ml/min; patient now eligible for 1g Q8h. Reached out to MD (Dr. Gallo) to recommend dose optimization. MD agreed. Order adjusted per discussion. 98.1

## 2024-06-13 NOTE — ASU DISCHARGE PLAN (ADULT/PEDIATRIC) - MEDICATION INSTRUCTIONS
Tylenol 975mg every 6 hours and Ibuprofen 600mg every 6 hours as needed Tylenol 975mg every 6 hours and Toradol 10mg every 6 hours as needed

## 2024-06-13 NOTE — BRIEF OPERATIVE NOTE - NSICDXBRIEFPROCEDURE_GEN_ALL_CORE_FT
PROCEDURES:  Exam under anesthesia, pelvic 13-Jun-2024 13:27:31  Alicia Maradiaga  Dilation and curettage, uterus, using suction, with US guidance 13-Jun-2024 13:29:26  Alicia Maradiaga

## 2024-06-13 NOTE — BRIEF OPERATIVE NOTE - OPERATION/FINDINGS
EUA: rigid abdomen, anteverted uterus with palpable posterior fibroid with fundus at approx 18cm. Thin endometrial stripe noted on ultrasound at end of procedure. Good hemostasis noted. 1000mcg rectal Cytotec administered at end of procedure for prophylaxis. EUA: rigid abdomen, anteverted uterus with palpable posterior fibroid with fundus at approx 18-20cm. Thin endometrial stripe noted on ultrasound at end of procedure. Good hemostasis noted. 1000mcg rectal Cytotec administered at end of procedure for prophylaxis.

## 2024-06-13 NOTE — BRIEF OPERATIVE NOTE - NSICDXBRIEFPREOP_GEN_ALL_CORE_FT
PRE-OP DIAGNOSIS:  Degeneration of uterine fibroid 13-Jun-2024 13:28:51  Alicia Maradiaga  Intrauterine pregnancy 13-Jun-2024 13:28:56 IUP@7w1d Alicia Maradiaga

## 2024-06-13 NOTE — BRIEF OPERATIVE NOTE - NSICDXBRIEFPOSTOP_GEN_ALL_CORE_FT
POST-OP DIAGNOSIS:  Intrauterine pregnancy 13-Jun-2024 13:29:13 IUP@7w1d Alicia Maradiaga  Degeneration of uterine fibroid 13-Jun-2024 13:29:05  Alicia Maradiaga

## 2024-06-13 NOTE — ASU DISCHARGE PLAN (ADULT/PEDIATRIC) - NS MD DC FALL RISK RISK
For information on Fall & Injury Prevention, visit: [email or web address] OR  [email or web address] OR  https://www.cdc.gov/steadi/patient.html

## 2024-06-13 NOTE — ASU DISCHARGE PLAN (ADULT/PEDIATRIC) - CARE PROVIDER_API CALL
Bell Mcdermott  Obstetrics and Gynecology  [address] [place] [address], [address]  [place] [address]  Phone: [phone]  Fax: [phone]  Scheduled Appointment: 07/03/2024 06:15 PM

## 2024-06-18 ENCOUNTER — APPOINTMENT (OUTPATIENT)
Dept: UROLOGY | Facility: CLINIC | Age: 39
End: 2024-06-18
Payer: COMMERCIAL

## 2024-06-18 VITALS
TEMPERATURE: 98.5 F | SYSTOLIC BLOOD PRESSURE: 112 MMHG | DIASTOLIC BLOOD PRESSURE: 73 MMHG | BODY MASS INDEX: 24.32 KG/M2 | WEIGHT: 146 LBS | RESPIRATION RATE: 17 BRPM | HEIGHT: 65 IN | HEART RATE: 94 BPM

## 2024-06-18 DIAGNOSIS — R39.89 OTHER SYMPTOMS AND SIGNS INVOLVING THE GENITOURINARY SYSTEM: ICD-10-CM

## 2024-06-18 DIAGNOSIS — R33.9 RETENTION OF URINE, UNSPECIFIED: ICD-10-CM

## 2024-06-18 PROCEDURE — 99215 OFFICE O/P EST HI 40 MIN: CPT

## 2024-06-18 NOTE — ASSESSMENT
[FreeTextEntry1] : 39yo F with urinary retention, now voiding small volumes frequently. PVR unable to be obtained due to large fibroid.  -Discussed that the fibroid is compressing the bladder and right ureter, which is likely the cause of urinary frequency and retention. Discussed other etiologies including neurologic causes and behavioral causes including holding behaviors. If still having urinary issues after fibroid removal, follow up for further evaluation which may include UDS. -Continue urinating and CIC as per your routine -Urine culture

## 2024-06-18 NOTE — HISTORY OF PRESENT ILLNESS
[FreeTextEntry1] : 39yo F (CRNA) with urinary retention. She presented to the ED with abdominal pain on 5/26/24 and was found to be pregnant (~1 month) and had acute urinary retention. Michael placed with 500cc urine output, had "excruciating pain" when michael was placed. Saw AARON Vargas here on 5/28 and michael was removed and instructed to do CIC. On 5/29 she returned to ED as she had residual urine after attempting CIC, michael was then placed. [place] [facility] She was unable to void and she replaced michael. Michael was then replaced and removed 6/13 during procedure for termination of pregnancy. She had to have her pregnancy terminated due to severe pain from large uterine fibroids. ED MRI demonstrated mild R hydro secondary to large uterine fibroid.  Reports urinary frequency with 100-200cc per hour or every other hour, has noticed sediment in urine. Since starting a new job in March, she noticed some urinary hesitancy but prior to that she reports no urologic symptoms. She reports ongoing lower abdominal pain, which she is not sure if it is related to her bladder, her recent procedure, or the fibroid. She has otherwise been healthy without any medical problems. She plans to have surgery to remove the fibroid in July.

## 2024-06-18 NOTE — DISCUSSION/SUMMARY
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 She will do CIC -She will continue f/u with Dr. Mcdermott for OB care -She was advised to self-catheterize 3-4 times a day and to measure the output.  If PVR <200cc for 2 consecutive days, she may discontinue CIC  -Catheter supplies have been ordered. All questions were answered to her satisfaction.

## 2024-06-18 NOTE — HISTORY OF PRESENT ILLNESS
[FreeTextEntry1] : Melva has a h/o enlarged fibroid uterus, incomplete bladder emptying, mild right hydronephrosis and early IUP pregnancy. She had a pessary placed on last visit in hopes to elevate uterus off bladder to see if obstruction relieved.  She was able to urinate during the last visit with the pessary placed, she was also instructed to perform CIC for her incomplete bladder emptying.  Since last visit, she has had several visits to the ER for severe bladder and abdominal pain and had a michael placement. She has established obstetrical care with Dr. Mcdermott and reports that she will likely have an elective termination due to fibroid size and will have surgical removal of the fibroid thereafter.  She currently is experiencing continued abdominal and back pain.  She would like for the pessary to be removed since she is still not completely emptying her bladder.

## 2024-06-18 NOTE — PROCEDURE
[Other ___] : [unfilled] [Urinary Retention] : urinary retention [Indwelling Catheter] : an indwelling catheter [No Complications] : no complications [Tolerated Well] : the patient tolerated the procedure well [Good Fit] : fits well [Pessary Out] : removed [Refit] : refit is not needed [Erosion] : no evidence of erosion [Erythema] : no erythema [Discharge] : no vaginal discharge [Infection] : no evidence of infection [FreeTextEntry1] : RS#4

## 2024-06-18 NOTE — PHYSICAL EXAM
[No Acute Distress] : in no acute distress [Well developed] : well developed [Well Nourished] : ~L well nourished [Good Hygeine] : demonstrates good hygeine [Mass (___ Cm)] : a(n) [unfilled] cm abdominal mass was palpated [Labia Majora] : were normal [Normal Appearance] : general appearance was normal [Normal] : normal [de-identified] : Enlarged fibroid uterus, postterior myoma compressing bladder [de-identified] : indwelling cath

## 2024-06-19 PROBLEM — Z98.890 OTHER SPECIFIED POSTPROCEDURAL STATES: Chronic | Status: ACTIVE | Noted: 2024-06-10

## 2024-06-20 LAB — BACTERIA UR CULT: NORMAL

## 2024-06-26 LAB — SURGICAL PATHOLOGY STUDY: SIGNIFICANT CHANGE UP

## 2024-06-28 ENCOUNTER — NON-APPOINTMENT (OUTPATIENT)
Age: 39
End: 2024-06-28

## 2024-07-01 ENCOUNTER — OUTPATIENT (OUTPATIENT)
Dept: OUTPATIENT SERVICES | Facility: HOSPITAL | Age: 39
LOS: 1 days | End: 2024-07-01
Payer: COMMERCIAL

## 2024-07-01 VITALS
SYSTOLIC BLOOD PRESSURE: 115 MMHG | RESPIRATION RATE: 20 BRPM | HEIGHT: 65 IN | HEART RATE: 100 BPM | DIASTOLIC BLOOD PRESSURE: 78 MMHG | WEIGHT: 130.07 LBS | TEMPERATURE: 98 F | OXYGEN SATURATION: 98 %

## 2024-07-01 DIAGNOSIS — D25.9 LEIOMYOMA OF UTERUS, UNSPECIFIED: ICD-10-CM

## 2024-07-01 DIAGNOSIS — Z01.818 ENCOUNTER FOR OTHER PREPROCEDURAL EXAMINATION: ICD-10-CM

## 2024-07-01 DIAGNOSIS — Z29.9 ENCOUNTER FOR PROPHYLACTIC MEASURES, UNSPECIFIED: ICD-10-CM

## 2024-07-01 DIAGNOSIS — Z98.890 OTHER SPECIFIED POSTPROCEDURAL STATES: Chronic | ICD-10-CM

## 2024-07-01 LAB
ANION GAP SERPL CALC-SCNC: 13 MMOL/L — SIGNIFICANT CHANGE UP (ref 5–17)
BUN SERPL-MCNC: 11 MG/DL — SIGNIFICANT CHANGE UP (ref 7–23)
CALCIUM SERPL-MCNC: 9.9 MG/DL — SIGNIFICANT CHANGE UP (ref 8.4–10.5)
CHLORIDE SERPL-SCNC: 99 MMOL/L — SIGNIFICANT CHANGE UP (ref 96–108)
CO2 SERPL-SCNC: 27 MMOL/L — SIGNIFICANT CHANGE UP (ref 22–31)
CREAT SERPL-MCNC: 0.79 MG/DL — SIGNIFICANT CHANGE UP (ref 0.5–1.3)
EGFR: 98 ML/MIN/1.73M2 — SIGNIFICANT CHANGE UP
GLUCOSE SERPL-MCNC: 101 MG/DL — HIGH (ref 70–99)
HCT VFR BLD CALC: 35.1 % — SIGNIFICANT CHANGE UP (ref 34.5–45)
HGB BLD-MCNC: 11.2 G/DL — LOW (ref 11.5–15.5)
MCHC RBC-ENTMCNC: 26 PG — LOW (ref 27–34)
MCHC RBC-ENTMCNC: 31.9 GM/DL — LOW (ref 32–36)
MCV RBC AUTO: 81.4 FL — SIGNIFICANT CHANGE UP (ref 80–100)
NRBC # BLD: 0 /100 WBCS — SIGNIFICANT CHANGE UP (ref 0–0)
PLATELET # BLD AUTO: 493 K/UL — HIGH (ref 150–400)
POTASSIUM SERPL-MCNC: 4 MMOL/L — SIGNIFICANT CHANGE UP (ref 3.5–5.3)
POTASSIUM SERPL-SCNC: 4 MMOL/L — SIGNIFICANT CHANGE UP (ref 3.5–5.3)
RBC # BLD: 4.31 M/UL — SIGNIFICANT CHANGE UP (ref 3.8–5.2)
RBC # FLD: 13.2 % — SIGNIFICANT CHANGE UP (ref 10.3–14.5)
SODIUM SERPL-SCNC: 139 MMOL/L — SIGNIFICANT CHANGE UP (ref 135–145)
WBC # BLD: 9.32 K/UL — SIGNIFICANT CHANGE UP (ref 3.8–10.5)
WBC # FLD AUTO: 9.32 K/UL — SIGNIFICANT CHANGE UP (ref 3.8–10.5)

## 2024-07-01 PROCEDURE — 85027 COMPLETE CBC AUTOMATED: CPT

## 2024-07-01 PROCEDURE — G0463: CPT

## 2024-07-01 PROCEDURE — 80048 BASIC METABOLIC PNL TOTAL CA: CPT

## 2024-07-01 RX ORDER — ACETAMINOPHEN 500 MG
3 TABLET ORAL
Qty: 0 | Refills: 0 | DISCHARGE

## 2024-07-03 ENCOUNTER — APPOINTMENT (OUTPATIENT)
Dept: OBGYN | Facility: CLINIC | Age: 39
End: 2024-07-03
Payer: COMMERCIAL

## 2024-07-03 VITALS
BODY MASS INDEX: 21.83 KG/M2 | HEART RATE: 76 BPM | WEIGHT: 131 LBS | DIASTOLIC BLOOD PRESSURE: 80 MMHG | SYSTOLIC BLOOD PRESSURE: 117 MMHG | HEIGHT: 65 IN

## 2024-07-03 PROCEDURE — 99024 POSTOP FOLLOW-UP VISIT: CPT

## 2024-07-22 ENCOUNTER — APPOINTMENT (OUTPATIENT)
Dept: OBGYN | Facility: HOSPITAL | Age: 39
End: 2024-07-22

## 2024-07-24 ENCOUNTER — TRANSCRIPTION ENCOUNTER (OUTPATIENT)
Age: 39
End: 2024-07-24

## 2024-07-30 ENCOUNTER — APPOINTMENT (OUTPATIENT)
Dept: UROLOGY | Facility: CLINIC | Age: 39
End: 2024-07-30

## 2024-08-07 ENCOUNTER — APPOINTMENT (OUTPATIENT)
Dept: OBGYN | Facility: CLINIC | Age: 39
End: 2024-08-07

## 2024-08-07 PROCEDURE — 99024 POSTOP FOLLOW-UP VISIT: CPT

## 2024-08-09 NOTE — HISTORY OF PRESENT ILLNESS
[Pain is well-controlled] : pain is well-controlled [Clean/Dry/Intact] : clean, dry and intact [Normal] : normal [Pathology reviewed] : pathology reviewed [Mild] : mild vaginal bleeding [Fever] : no fever [Chills] : no chills [Nausea] : no nausea [Vomiting] : no vomiting [Erythema] : not erythematous [de-identified] : 7/22/2024 [de-identified] :  abdominal myomectomy [de-identified] : Uterine fibroid in pregnancy [de-identified] : 39 year old female presents s/p abdominal myomectomy. Pt reports moderate pain which has decreased since last post-op. Pt c/o a pulling sensation towards the end of urination. Pt reports feeling tired and low appetite. Pt uses Tylenol and Advil for pain relief [de-identified] : abd soft, nt distended

## 2024-08-09 NOTE — HISTORY OF PRESENT ILLNESS
[Pain is well-controlled] : pain is well-controlled [Clean/Dry/Intact] : clean, dry and intact [Normal] : normal [Pathology reviewed] : pathology reviewed [Mild] : mild vaginal bleeding [Fever] : no fever [Chills] : no chills [Nausea] : no nausea [Vomiting] : no vomiting [Erythema] : not erythematous [de-identified] : 7/22/2024 [de-identified] :  abdominal myomectomy [de-identified] : Uterine fibroid in pregnancy [de-identified] : 39 year old female presents s/p abdominal myomectomy. Pt reports moderate pain which has decreased since last post-op. Pt c/o a pulling sensation towards the end of urination. Pt reports feeling tired and low appetite. Pt uses Tylenol and Advil for pain relief [de-identified] : abd soft, nt distended

## 2024-08-09 NOTE — PLAN
[FreeTextEntry1] : 39 year old female presents s/p abdominal myomectomy.  - CBC and AMH done today - Rx pelvic ultrasound  -F/u tele to discuss ultrasound results

## 2024-08-19 ENCOUNTER — NON-APPOINTMENT (OUTPATIENT)
Age: 39
End: 2024-08-19

## 2024-08-21 ENCOUNTER — TRANSCRIPTION ENCOUNTER (OUTPATIENT)
Age: 39
End: 2024-08-21

## 2024-08-28 NOTE — ED ADULT NURSE NOTE - MODE OF DISCHARGE
Department of Obstetrics and Gynecology  Postpartum Discharge Summary      Admit Date: 2024    Admit Diagnosis: Encounter for induction of labor [Z34.90]    Discharge Date:   24    Discharge Diagnoses: spontaneous vaginal delivery       Medication List        ASK your doctor about these medications      diphenhydrAMINE 25 MG tablet  Commonly known as: BENADRYL     ferrous sulfate 325 (65 Fe) MG tablet  Commonly known as: IRON 325     therapeutic multivitamin-minerals tablet     ursodiol 500 MG tablet  Commonly known as: ACTIGALL  Take 1 tablet by mouth 3 times daily              Service: Obstetrics    Consults: none    Significant Diagnostic Studies: none    Postpartum complications: none     Condition at Discharge: good    Hospital Course: uncomplicated    Soft nt    Discharge Instructions:     Activity: no sex for 6 weeks and as tolerated    Diet: regular diet    Instructions: No intercourse and nothing in the vagina for 6 weeks. Do not drive while using pain medications. Keep any wounds clean and dry    Discharge to: Home    Disposition / Follow up: Return to office in 6 weeks    Home Health Nurse visit within 24-48 h if qualifies    [place] Data:  Apgars:  Information for the patient's :  Karthik Argueta [[id number]]   APGAR One: 9  Information for the patient's :  Karthik Argueta [[id number]]   APGAR Five: 9  Birth Weight:  Information for the patient's :  Karthik Argueta [[id number]]   Birth Weight: 3.544 kg (7 lb 13 oz)  Home with mother    Electronically signed by SARAH FLESCH SABIN, MD on 2024 at 9:34 AM    
Ambulatory

## 2024-08-30 ENCOUNTER — TRANSCRIPTION ENCOUNTER (OUTPATIENT)
Age: 39
End: 2024-08-30

## 2025-05-27 NOTE — BRIEF OPERATIVE NOTE - ANTIBIOTIC PROTOCOL
Loss Metrics   Height 5' 3\" 5' 3\" 5' 3\" 5' 2.992\" 5' 3\" 5' 3\" 5' 3\"   Weight - Scale 171 lbs 173 lbs 170 lbs 170 lbs 13 oz 172 lbs 3 oz 172 lbs 6 oz 169 lbs 11 oz   BMI (Calculated) 30.4 kg/m2 30.7 kg/m2 30.2 kg/m2 30.3 kg/m2 30.6 kg/m2 30.6 kg/m2 30.1 kg/m2       PAST MEDICAL HISTORY  Past Medical History:   Diagnosis Date    37 weeks gestation of pregnancy 06/28/2016    Anemia     Anxiety     Celiac disease     Chronic back pain     COVID-19 01/01/2022    Positive test (see under media)    Depression     Diverticulosis     Dyspareunia in female     Fatigue     Fractures     Gastritis     GERD (gastroesophageal reflux disease)     H/O bariatric surgery     H/O echocardiogram 05/21/2021    EF 55-60% no evidence of pericardial effusion no significant valvular regurgitation    History of blood transfusion 2014    Received 5 units after childbirth    History of postpartum hemorrhage, currently pregnant in third trimester 06/28/2016    Hx of cardiovascular stress test 05/07/2021    Normal study    Irritable bowel syndrome with constipation     Menopausal symptoms     Migraines     Preeclampsia 2014    Prolonged emergence from general anesthesia     \"sometimes it take a little longer for me to wake up\"    Sciatica     Urinary incontinence        FAMILY HISTORY  Family History   Problem Relation Age of Onset    Cancer Mother         Stomach/Uterine    Heart Disease Mother     Uterine Cancer Mother     Stomach Cancer Mother     Heart Attack Mother     Osteoporosis Mother     COPD Mother     Alcohol Abuse Mother     Depression Mother     Early Death Mother         51    Miscarriages / Stillbirths Mother     Obesity Mother     Substance Abuse Mother     Coronary Art Dis Mother     Diabetes Father     High Blood Pressure Father     Coronary Art Dis Father     Thyroid Cancer Father     Alcohol Abuse Father     Cancer Father         Thyroid    Obesity Father     Substance Abuse Father     Alcohol Abuse Sister     Allergy  Doxycycline 200mg/Followed protocol

## 2025-08-06 ENCOUNTER — NON-APPOINTMENT (OUTPATIENT)
Age: 40
End: 2025-08-06

## 2025-08-08 ENCOUNTER — APPOINTMENT (OUTPATIENT)
Dept: OBGYN | Facility: CLINIC | Age: 40
End: 2025-08-08

## 2025-08-08 VITALS
DIASTOLIC BLOOD PRESSURE: 74 MMHG | HEIGHT: 65 IN | WEIGHT: 145 LBS | BODY MASS INDEX: 24.16 KG/M2 | SYSTOLIC BLOOD PRESSURE: 106 MMHG

## 2025-08-08 DIAGNOSIS — Z11.51 ENCOUNTER FOR SCREENING FOR HUMAN PAPILLOMAVIRUS (HPV): ICD-10-CM

## 2025-08-08 DIAGNOSIS — Z01.419 ENCOUNTER FOR GYNECOLOGICAL EXAMINATION (GENERAL) (ROUTINE) W/OUT ABNORMAL FINDINGS: ICD-10-CM

## 2025-08-08 DIAGNOSIS — B96.89 ACUTE VAGINITIS: ICD-10-CM

## 2025-08-08 DIAGNOSIS — Z34.01 ENCOUNTER FOR SUPERVISION OF NORMAL FIRST PREGNANCY, FIRST TRIMESTER: ICD-10-CM

## 2025-08-08 DIAGNOSIS — N76.0 ACUTE VAGINITIS: ICD-10-CM

## 2025-08-08 PROCEDURE — 99213 OFFICE O/P EST LOW 20 MIN: CPT | Mod: 25

## 2025-08-08 PROCEDURE — 76817 TRANSVAGINAL US OBSTETRIC: CPT

## 2025-08-14 PROBLEM — N76.0 BACTERIAL VAGINOSIS: Status: ACTIVE | Noted: 2025-08-14

## 2025-08-14 LAB
25(OH)D3 SERPL-MCNC: 27 NG/ML
ABORH: NORMAL
ANTIBODY SCREEN: NORMAL
B19V IGG SER QL IA: 0.15 INDEX
B19V IGG+IGM SER-IMP: NEGATIVE
B19V IGG+IGM SER-IMP: NORMAL
B19V IGM FLD-ACNC: 0.11 INDEX
B19V IGM SER-ACNC: NEGATIVE
BACTERIA UR CULT: NORMAL
BV BACTERIA RRNA VAG QL NAA+PROBE: DETECTED
C GLABRATA RNA VAG QL NAA+PROBE: NOT DETECTED
C TRACH RRNA SPEC QL NAA+PROBE: NOT DETECTED
CANDIDA RRNA VAG QL PROBE: NOT DETECTED
CYTOLOGY CVX/VAG DOC THIN PREP: NORMAL
ESTIMATED AVERAGE GLUCOSE: 111 MG/DL
HBA1C MFR BLD HPLC: 5.5 %
HBV SURFACE AG SER QL: NONREACTIVE
HGB A MFR BLD: 96.6 %
HGB A2 MFR BLD: 2.7 %
HGB F MFR BLD: 0.7 %
HGB FRACT BLD-IMP: NORMAL
HIV1+2 AB SPEC QL IA.RAPID: NONREACTIVE
HPV HIGH+LOW RISK DNA PNL CVX: NOT DETECTED
LEAD BLD-MCNC: <1 UG/DL
MEV IGG FLD QL IA: 33.1 AU/ML
MEV IGG+IGM SER-IMP: POSITIVE
MUV AB SER-ACNC: POSITIVE
MUV IGG SER QL IA: 258 AU/ML
N GONORRHOEA RRNA SPEC QL NAA+PROBE: NOT DETECTED
RUBV IGG FLD-ACNC: 4.4 INDEX
RUBV IGG FLD-ACNC: 4.4 INDEX
RUBV IGG SER-IMP: POSITIVE
RUBV IGG SER-IMP: POSITIVE
T PALLIDUM AB SER QL IA: NEGATIVE
T VAGINALIS RRNA SPEC QL NAA+PROBE: NOT DETECTED
TSH SERPL-ACNC: 0.01 UIU/ML
VZV AB TITR SER: POSITIVE
VZV IGG SER IF-ACNC: 3.66 S/CO

## 2025-08-14 RX ORDER — METRONIDAZOLE 7.5 MG/G
0.75 GEL VAGINAL
Qty: 1 | Refills: 0 | Status: ACTIVE | COMMUNITY
Start: 2025-08-14 | End: 1900-01-01

## 2025-08-19 ENCOUNTER — TRANSCRIPTION ENCOUNTER (OUTPATIENT)
Age: 40
End: 2025-08-19

## 2025-08-20 ENCOUNTER — APPOINTMENT (OUTPATIENT)
Dept: OBGYN | Facility: CLINIC | Age: 40
End: 2025-08-20
Payer: COMMERCIAL

## 2025-08-20 ENCOUNTER — LABORATORY RESULT (OUTPATIENT)
Age: 40
End: 2025-08-20

## 2025-08-20 ENCOUNTER — ASOB RESULT (OUTPATIENT)
Age: 40
End: 2025-08-20

## 2025-08-20 PROCEDURE — 36415 COLL VENOUS BLD VENIPUNCTURE: CPT

## 2025-08-20 PROCEDURE — 0500F INITIAL PRENATAL CARE VISIT: CPT

## 2025-08-20 PROCEDURE — 76801 OB US < 14 WKS SINGLE FETUS: CPT

## 2025-08-20 PROCEDURE — 76813 OB US NUCHAL MEAS 1 GEST: CPT

## 2025-08-26 LAB
T3 SERPL-MCNC: 144 NG/DL
T3RU NFR SERPL: 1.2 TBI
T4 FREE SERPL-MCNC: 0.8 NG/DL
T4 SERPL-MCNC: 6.7 UG/DL
TSH SERPL-ACNC: 0.08 UIU/ML

## 2025-09-15 ENCOUNTER — APPOINTMENT (OUTPATIENT)
Dept: OBGYN | Facility: CLINIC | Age: 40
End: 2025-09-15
Payer: COMMERCIAL

## 2025-09-15 DIAGNOSIS — R79.89 OTHER SPECIFIED ABNORMAL FINDINGS OF BLOOD CHEMISTRY: ICD-10-CM

## 2025-09-15 DIAGNOSIS — Z34.92 ENCOUNTER FOR SUPERVISION OF NORMAL PREGNANCY, UNSPECIFIED, SECOND TRIMESTER: ICD-10-CM

## 2025-09-15 PROCEDURE — 36415 COLL VENOUS BLD VENIPUNCTURE: CPT

## 2025-09-15 PROCEDURE — 0502F SUBSEQUENT PRENATAL CARE: CPT

## 2025-09-16 LAB
2ND TRIMESTER 4 SCREEN SERPL-IMP: NO
AFP ADJ MOM SERPL: 1.07
AFP INTERP SERPL-IMP: NORMAL
AFP INTERP SERPL-IMP: NORMAL
AFP MOM CUT-OFF: 2.8
AFP PERCENTILE: 57.9
AFP SERPL-ACNC: 39.62 NG/ML
CARBAMAZEPINE?: NO
CURRENT SMOKER: NO
DIABETES STATUS PATIENT: NORMAL
GA: NORMAL
GESTATIONAL AGE METHOD: NORMAL
HX OF NTD NARR: NORMAL
MULTIPLE PREGNANCY: NORMAL
NEURAL TUBE DEFECT RISK FETUS: NORMAL
NEURAL TUBE DEFECT RISK POP: NORMAL
T3FREE SERPL-MCNC: 2.81 PG/ML
T4 FREE SERPL-MCNC: 0.8 NG/DL
TEST PERFORMANCE INFO SPEC: NORMAL
THYROGLOB AB SERPL-ACNC: 17.8 IU/ML
THYROPEROXIDASE AB SERPL IA-ACNC: 13.1 IU/ML
VALPROIC ACID?: NORMAL

## (undated) DEVICE — DRAPE LIGHT HANDLE COVER (GREEN)

## (undated) DEVICE — PREP BETADINE KIT

## (undated) DEVICE — VACUUM CURETTE BERKLEY OLYMPUS F TIP 6MM

## (undated) DEVICE — TUBING UTERINE ASPIRATION 3/8" X 6FT W/O ADAPTER

## (undated) DEVICE — VACUUM CURETTE BUSSE HOSP STRAIGHT 7MM

## (undated) DEVICE — VACUUM CURETTE MEDGYN CURVED 8MM

## (undated) DEVICE — TUBING SUCTION CONNECTOR UTERINE ASPIRATION UVAC 0.5" X 6FT

## (undated) DEVICE — GLV 6.5 PROTEXIS (WHITE)

## (undated) DEVICE — VACUUM CURETTE BUSSE HOSP CURVED 11MM

## (undated) DEVICE — PACK LITHOTOMY

## (undated) DEVICE — VACUUM CURETTE BUSSE HOSP CURVED 9MM

## (undated) DEVICE — VACUUM CURETTE BUSSE HOSP CURVED 10MM

## (undated) DEVICE — SOCK SPECIMEN 3/8"-1/2" MALE PORT

## (undated) DEVICE — FOLEY TRAY 16FR LF URINE METER SURESTEP

## (undated) DEVICE — SOL IRR POUR NS 0.9% 500ML

## (undated) DEVICE — VENODYNE/SCD SLEEVE CALF MEDIUM

## (undated) DEVICE — POSITIONER FOAM EGG CRATE ULNAR 2PCS (PINK)

## (undated) DEVICE — VACUUM CURETTE BUSSE HOSP CURVED 12MM

## (undated) DEVICE — WARMING BLANKET UPPER ADULT

## (undated) DEVICE — VACUUM CURETTE BUSSE HOSP CURVED 14MM